# Patient Record
Sex: MALE | Race: BLACK OR AFRICAN AMERICAN | NOT HISPANIC OR LATINO | Employment: FULL TIME | RURAL
[De-identification: names, ages, dates, MRNs, and addresses within clinical notes are randomized per-mention and may not be internally consistent; named-entity substitution may affect disease eponyms.]

---

## 2015-09-04 LAB — CRC RECOMMENDATION EXT: NORMAL

## 2020-11-11 ENCOUNTER — HISTORICAL (OUTPATIENT)
Dept: ADMINISTRATIVE | Facility: HOSPITAL | Age: 50
End: 2020-11-11

## 2020-11-11 LAB — SARS-COV+SARS-COV-2 AG RESP QL IA.RAPID: NEGATIVE

## 2020-12-14 ENCOUNTER — HISTORICAL (OUTPATIENT)
Dept: ADMINISTRATIVE | Facility: HOSPITAL | Age: 50
End: 2020-12-14

## 2020-12-14 LAB
CHOLEST SERPL-MCNC: 198 MG/DL
HDLC SERPL-MCNC: 113 MG/DL
LDLC SERPL CALC-MCNC: 76 MG/DL
NONHDLC SERPL-MCNC: 85 MG/DL
TRIGL SERPL-MCNC: 47 MG/DL
VLDLC SERPL-MCNC: 9 MG/DL

## 2021-01-13 ENCOUNTER — HISTORICAL (OUTPATIENT)
Dept: ADMINISTRATIVE | Facility: HOSPITAL | Age: 51
End: 2021-01-13

## 2021-01-28 ENCOUNTER — HISTORICAL (OUTPATIENT)
Dept: ADMINISTRATIVE | Facility: HOSPITAL | Age: 51
End: 2021-01-28

## 2021-05-07 VITALS
DIASTOLIC BLOOD PRESSURE: 90 MMHG | BODY MASS INDEX: 24.31 KG/M2 | WEIGHT: 179.5 LBS | HEART RATE: 108 BPM | OXYGEN SATURATION: 96 % | RESPIRATION RATE: 20 BRPM | SYSTOLIC BLOOD PRESSURE: 140 MMHG | TEMPERATURE: 98 F | HEIGHT: 72 IN

## 2021-05-07 RX ORDER — PANTOPRAZOLE SODIUM 40 MG/1
40 TABLET, DELAYED RELEASE ORAL DAILY
COMMUNITY
End: 2022-09-13 | Stop reason: SDUPTHER

## 2021-05-07 RX ORDER — ERGOCALCIFEROL 1.25 MG/1
50000 CAPSULE ORAL
COMMUNITY
End: 2022-09-13

## 2021-05-07 RX ORDER — AMLODIPINE BESYLATE 5 MG/1
5 TABLET ORAL DAILY
COMMUNITY
End: 2022-04-13

## 2021-06-01 DIAGNOSIS — D64.9 LOW HEMOGLOBIN: Primary | ICD-10-CM

## 2021-08-19 ENCOUNTER — OFFICE VISIT (OUTPATIENT)
Dept: PRIMARY CARE CLINIC | Facility: CLINIC | Age: 51
End: 2021-08-19

## 2021-08-19 VITALS
OXYGEN SATURATION: 96 % | BODY MASS INDEX: 23.16 KG/M2 | HEART RATE: 114 BPM | WEIGHT: 171 LBS | SYSTOLIC BLOOD PRESSURE: 140 MMHG | TEMPERATURE: 97 F | DIASTOLIC BLOOD PRESSURE: 84 MMHG | HEIGHT: 72 IN

## 2021-08-19 DIAGNOSIS — L02.212 ABSCESS OF UPPER BACK EXCLUDING SCAPULAR REGION: Primary | ICD-10-CM

## 2021-08-19 PROCEDURE — 10060 I&D ABSCESS SIMPLE/SINGLE: CPT | Mod: ,,, | Performed by: FAMILY MEDICINE

## 2021-08-19 PROCEDURE — 99213 PR OFFICE/OUTPT VISIT, EST, LEVL III, 20-29 MIN: ICD-10-PCS | Mod: 25,,, | Performed by: FAMILY MEDICINE

## 2021-08-19 PROCEDURE — 87653 STREP B DNA AMP PROBE: CPT | Mod: ,,, | Performed by: CLINICAL MEDICAL LABORATORY

## 2021-08-19 PROCEDURE — 87653 CULTURE, WOUND: ICD-10-PCS | Mod: ,,, | Performed by: CLINICAL MEDICAL LABORATORY

## 2021-08-19 PROCEDURE — 10060 PR DRAIN SKIN ABSCESS SIMPLE: ICD-10-PCS | Mod: ,,, | Performed by: FAMILY MEDICINE

## 2021-08-19 PROCEDURE — 99213 OFFICE O/P EST LOW 20 MIN: CPT | Mod: 25,,, | Performed by: FAMILY MEDICINE

## 2021-08-19 RX ORDER — IRON,CARB/VIT C/VIT B12/FOLIC 100-250-1
TABLET ORAL
COMMUNITY
Start: 2021-05-14 | End: 2022-04-13 | Stop reason: SDUPTHER

## 2021-08-19 RX ORDER — LIDOCAINE HYDROCHLORIDE 10 MG/ML
1 INJECTION, SOLUTION EPIDURAL; INFILTRATION; INTRACAUDAL; PERINEURAL
Status: DISCONTINUED | OUTPATIENT
Start: 2021-08-19 | End: 2022-04-13

## 2021-08-19 RX ORDER — POTASSIUM CHLORIDE 750 MG/1
TABLET, EXTENDED RELEASE ORAL
COMMUNITY
Start: 2021-05-14 | End: 2022-09-13 | Stop reason: SDUPTHER

## 2021-08-19 RX ORDER — SULFAMETHOXAZOLE AND TRIMETHOPRIM 800; 160 MG/1; MG/1
1 TABLET ORAL 2 TIMES DAILY
Qty: 20 TABLET | Refills: 0 | Status: SHIPPED | OUTPATIENT
Start: 2021-08-19 | End: 2022-04-13

## 2021-08-19 RX ORDER — SILDENAFIL 50 MG/1
TABLET, FILM COATED ORAL
COMMUNITY
Start: 2021-07-16 | End: 2022-04-13

## 2021-08-21 LAB — MICROORGANISM SPEC CULT: NORMAL

## 2021-08-26 ENCOUNTER — OFFICE VISIT (OUTPATIENT)
Dept: PRIMARY CARE CLINIC | Facility: CLINIC | Age: 51
End: 2021-08-26

## 2021-08-26 VITALS
SYSTOLIC BLOOD PRESSURE: 144 MMHG | HEIGHT: 72 IN | HEART RATE: 97 BPM | DIASTOLIC BLOOD PRESSURE: 88 MMHG | OXYGEN SATURATION: 99 % | RESPIRATION RATE: 20 BRPM | BODY MASS INDEX: 22.35 KG/M2 | WEIGHT: 165 LBS

## 2021-08-26 DIAGNOSIS — L02.212 ABSCESS OF BACK: Primary | ICD-10-CM

## 2021-08-26 PROCEDURE — 99212 PR OFFICE/OUTPT VISIT, EST, LEVL II, 10-19 MIN: ICD-10-PCS | Mod: ,,, | Performed by: FAMILY MEDICINE

## 2021-08-26 PROCEDURE — 99212 OFFICE O/P EST SF 10 MIN: CPT | Mod: ,,, | Performed by: FAMILY MEDICINE

## 2021-08-26 RX ORDER — LIDOCAINE 50 MG/G
PATCH TOPICAL
COMMUNITY
Start: 2021-03-18 | End: 2022-04-13

## 2021-08-26 RX ORDER — PREDNISOLONE ACETATE 10 MG/ML
SUSPENSION/ DROPS OPHTHALMIC
COMMUNITY
Start: 2021-06-02 | End: 2022-04-13

## 2022-04-13 ENCOUNTER — OFFICE VISIT (OUTPATIENT)
Dept: FAMILY MEDICINE | Facility: CLINIC | Age: 52
End: 2022-04-13
Payer: COMMERCIAL

## 2022-04-13 VITALS
DIASTOLIC BLOOD PRESSURE: 69 MMHG | HEART RATE: 84 BPM | TEMPERATURE: 98 F | HEIGHT: 72 IN | OXYGEN SATURATION: 98 % | BODY MASS INDEX: 21.92 KG/M2 | SYSTOLIC BLOOD PRESSURE: 113 MMHG | RESPIRATION RATE: 18 BRPM | WEIGHT: 161.81 LBS

## 2022-04-13 DIAGNOSIS — D64.9 ANEMIA, UNSPECIFIED TYPE: ICD-10-CM

## 2022-04-13 DIAGNOSIS — H54.40 BLINDNESS OF RIGHT EYE WITH NORMAL VISION IN CONTRALATERAL EYE: ICD-10-CM

## 2022-04-13 DIAGNOSIS — Z11.4 SCREENING FOR HIV (HUMAN IMMUNODEFICIENCY VIRUS): ICD-10-CM
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
## 2022-04-13 DIAGNOSIS — F10.29 ALCOHOL DEPENDENCE WITH UNSPECIFIED ALCOHOL-INDUCED DISORDER: ICD-10-CM

## 2022-04-13 DIAGNOSIS — I51.7 LVH (LEFT VENTRICULAR HYPERTROPHY): ICD-10-CM

## 2022-04-13 DIAGNOSIS — R42 DIZZY SPELLS: Primary | ICD-10-CM

## 2022-04-13 DIAGNOSIS — R63.4 WEIGHT LOSS: ICD-10-CM

## 2022-04-13 DIAGNOSIS — Z12.5 SCREENING PSA (PROSTATE SPECIFIC ANTIGEN): ICD-10-CM

## 2022-04-13 DIAGNOSIS — T74.11XA ABUSIVE PHYSICAL RELATIONSHIP WITH PARTNER OR SPOUSE, INITIAL ENCOUNTER: ICD-10-CM

## 2022-04-13 DIAGNOSIS — R55 SYNCOPE AND COLLAPSE: ICD-10-CM

## 2022-04-13 DIAGNOSIS — R30.0 DYSURIA: ICD-10-CM

## 2022-04-13 DIAGNOSIS — Z11.59 ENCOUNTER FOR HEPATITIS C SCREENING TEST FOR LOW RISK PATIENT: ICD-10-CM

## 2022-04-13 DIAGNOSIS — Z11.3 SCREENING EXAMINATION FOR STD (SEXUALLY TRANSMITTED DISEASE): ICD-10-CM

## 2022-04-13 LAB
ALBUMIN SERPL BCP-MCNC: 3.6 G/DL (ref 3.5–5)
ALBUMIN/GLOB SERPL: 0.8 {RATIO}
ALP SERPL-CCNC: 73 U/L (ref 45–115)
ALT SERPL W P-5'-P-CCNC: 29 U/L (ref 16–61)
ANION GAP SERPL CALCULATED.3IONS-SCNC: 12 MMOL/L (ref 7–16)
AST SERPL W P-5'-P-CCNC: 55 U/L (ref 15–37)
BASOPHILS # BLD AUTO: 0.07 K/UL (ref 0–0.2)
BASOPHILS NFR BLD AUTO: 1.9 % (ref 0–1)
BILIRUB SERPL-MCNC: 0.2 MG/DL (ref 0–1.2)
BILIRUB SERPL-MCNC: NEGATIVE MG/DL
BLOOD URINE, POC: NEGATIVE
BUN SERPL-MCNC: 13 MG/DL (ref 7–18)
BUN/CREAT SERPL: 10 (ref 6–20)
CALCIUM SERPL-MCNC: 9.1 MG/DL (ref 8.5–10.1)
CHLORIDE SERPL-SCNC: 114 MMOL/L (ref 98–107)
CHOLEST SERPL-MCNC: 195 MG/DL (ref 0–200)
CHOLEST/HDLC SERPL: 1.8 {RATIO}
CO2 SERPL-SCNC: 23 MMOL/L (ref 21–32)
COLOR, POC UA: NORMAL
CREAT SERPL-MCNC: 1.25 MG/DL (ref 0.7–1.3)
DIFFERENTIAL METHOD BLD: ABNORMAL
EKG 12-LEAD: 10
EOSINOPHIL # BLD AUTO: 0.12 K/UL (ref 0–0.5)
EOSINOPHIL NFR BLD AUTO: 3.3 % (ref 1–4)
ERYTHROCYTE [DISTWIDTH] IN BLOOD BY AUTOMATED COUNT: 19.1 % (ref 11.5–14.5)
GLOBULIN SER-MCNC: 4.3 G/DL (ref 2–4)
GLUCOSE SERPL-MCNC: 83 MG/DL (ref 70–110)
GLUCOSE SERPL-MCNC: 92 MG/DL (ref 74–106)
GLUCOSE UR QL STRIP: NEGATIVE
HCT VFR BLD AUTO: 33 % (ref 40–54)
HCV AB SER QL: NORMAL
HDLC SERPL-MCNC: 111 MG/DL (ref 40–60)
HGB BLD-MCNC: 9.9 G/DL (ref 13.5–18)
HGB, POC: 10 G/DL (ref 14–18)
HIV 1+O+2 AB SERPL QL: NORMAL
IMM GRANULOCYTES # BLD AUTO: 0 K/UL (ref 0–0.04)
IMM GRANULOCYTES NFR BLD: 0 % (ref 0–0.4)
IRON SATN MFR SERPL: 3 % (ref 14–50)
IRON SERPL-MCNC: 14 ΜG/DL (ref 65–175)
KETONES UR QL STRIP: NEGATIVE
LDLC SERPL CALC-MCNC: 72 MG/DL
LDLC/HDLC SERPL: 0.6 {RATIO}
LEUKOCYTE ESTERASE URINE, POC: NEGATIVE
LYMPHOCYTES # BLD AUTO: 1.6 K/UL (ref 1–4.8)
LYMPHOCYTES NFR BLD AUTO: 43.6 % (ref 27–41)
MCH RBC QN AUTO: 25.8 PG (ref 27–31)
MCHC RBC AUTO-ENTMCNC: 30 G/DL (ref 32–36)
MCV RBC AUTO: 86.2 FL (ref 80–96)
MONOCYTES # BLD AUTO: 0.51 K/UL (ref 0–0.8)
MONOCYTES NFR BLD AUTO: 13.9 % (ref 2–6)
MPC BLD CALC-MCNC: 11.4 FL (ref 9.4–12.4)
NEUTROPHILS # BLD AUTO: 1.37 K/UL (ref 1.8–7.7)
NEUTROPHILS NFR BLD AUTO: 37.3 % (ref 53–65)
NITRITE, POC UA: NEGATIVE
NONHDLC SERPL-MCNC: 84 MG/DL
NRBC # BLD AUTO: 0 X10E3/UL
NRBC, AUTO (.00): 0 %
PH, POC UA: 5
PLATELET # BLD AUTO: 187 K/UL (ref 150–400)
POTASSIUM SERPL-SCNC: 3.8 MMOL/L (ref 3.5–5.1)
PR INTERVAL: NORMAL
PROT SERPL-MCNC: 7.9 G/DL (ref 6.4–8.2)
PROTEIN, POC: NEGATIVE
PRT AXES: NORMAL
QRS DURATION: 86
QT/QTC: NORMAL
RBC # BLD AUTO: 3.83 M/UL (ref 4.6–6.2)
SODIUM SERPL-SCNC: 145 MMOL/L (ref 136–145)
SPECIFIC GRAVITY, POC UA: 1.01
SYPHILIS AB INTERPRETATION: NORMAL
TIBC SERPL-MCNC: 438 ΜG/DL (ref 250–450)
TRIGL SERPL-MCNC: 60 MG/DL (ref 35–150)
TSH SERPL DL<=0.005 MIU/L-ACNC: 0.35 UIU/ML (ref 0.36–3.74)
UROBILINOGEN, POC UA: 0.2
VENTRICULAR RATE: 83
VIT B12 SERPL-MCNC: 293 PG/ML (ref 193–986)
VLDLC SERPL-MCNC: 12 MG/DL
WBC # BLD AUTO: 3.67 K/UL (ref 4.5–11)

## 2022-04-13 PROCEDURE — 3008F BODY MASS INDEX DOCD: CPT | Mod: ,,, | Performed by: FAMILY MEDICINE

## 2022-04-13 PROCEDURE — 93000 ELECTROCARDIOGRAM COMPLETE: CPT | Mod: ,,, | Performed by: FAMILY MEDICINE

## 2022-04-13 PROCEDURE — 99214 OFFICE O/P EST MOD 30 MIN: CPT | Mod: ,,, | Performed by: FAMILY MEDICINE

## 2022-04-13 PROCEDURE — 99214 PR OFFICE/OUTPT VISIT, EST, LEVL IV, 30-39 MIN: ICD-10-PCS | Mod: ,,, | Performed by: FAMILY MEDICINE

## 2022-04-13 PROCEDURE — 80050 GENERAL HEALTH PANEL: CPT | Mod: ,,, | Performed by: CLINICAL MEDICAL LABORATORY

## 2022-04-13 PROCEDURE — 87389 HIV 1 / 2 ANTIBODY: ICD-10-PCS | Mod: ,,, | Performed by: CLINICAL MEDICAL LABORATORY

## 2022-04-13 PROCEDURE — 85018 POCT HEMOGLOBIN: ICD-10-PCS | Mod: ,,, | Performed by: FAMILY MEDICINE

## 2022-04-13 PROCEDURE — 83540 ASSAY OF IRON: CPT | Mod: ,,, | Performed by: CLINICAL MEDICAL LABORATORY

## 2022-04-13 PROCEDURE — 80061 LIPID PANEL: ICD-10-PCS | Mod: ,,, | Performed by: CLINICAL MEDICAL LABORATORY

## 2022-04-13 PROCEDURE — 3078F PR MOST RECENT DIASTOLIC BLOOD PRESSURE < 80 MM HG: ICD-10-PCS | Mod: ,,, | Performed by: FAMILY MEDICINE

## 2022-04-13 PROCEDURE — 87591 CHLAMYDIA/GONORRHOEAE(GC), PCR: ICD-10-PCS | Mod: ,,, | Performed by: CLINICAL MEDICAL LABORATORY

## 2022-04-13 PROCEDURE — 1160F RVW MEDS BY RX/DR IN RCRD: CPT | Mod: ,,, | Performed by: FAMILY MEDICINE

## 2022-04-13 PROCEDURE — 80061 LIPID PANEL: CPT | Mod: ,,, | Performed by: CLINICAL MEDICAL LABORATORY

## 2022-04-13 PROCEDURE — 3078F DIAST BP <80 MM HG: CPT | Mod: ,,, | Performed by: FAMILY MEDICINE

## 2022-04-13 PROCEDURE — 84154 PSA, TOTAL AND FREE: ICD-10-PCS | Mod: 90,,, | Performed by: CLINICAL MEDICAL LABORATORY

## 2022-04-13 PROCEDURE — 3008F PR BODY MASS INDEX (BMI) DOCUMENTED: ICD-10-PCS | Mod: ,,, | Performed by: FAMILY MEDICINE

## 2022-04-13 PROCEDURE — 86780 TREPONEMA PALLIDUM: CPT | Mod: ,,, | Performed by: CLINICAL MEDICAL LABORATORY

## 2022-04-13 PROCEDURE — 3074F PR MOST RECENT SYSTOLIC BLOOD PRESSURE < 130 MM HG: ICD-10-PCS | Mod: ,,, | Performed by: FAMILY MEDICINE

## 2022-04-13 PROCEDURE — 87491 CHLAMYDIA/GONORRHOEAE(GC), PCR: ICD-10-PCS | Mod: ,,, | Performed by: CLINICAL MEDICAL LABORATORY

## 2022-04-13 PROCEDURE — 83540 IRON AND TIBC: ICD-10-PCS | Mod: ,,, | Performed by: CLINICAL MEDICAL LABORATORY

## 2022-04-13 PROCEDURE — 86803 HEPATITIS C AB TEST: CPT | Mod: ,,, | Performed by: CLINICAL MEDICAL LABORATORY

## 2022-04-13 PROCEDURE — 1159F PR MEDICATION LIST DOCUMENTED IN MEDICAL RECORD: ICD-10-PCS | Mod: ,,, | Performed by: FAMILY MEDICINE

## 2022-04-13 PROCEDURE — 86780 TREPONEMA PALLIDUM (SYPHILIS) ANTIBODY: ICD-10-PCS | Mod: ,,, | Performed by: CLINICAL MEDICAL LABORATORY

## 2022-04-13 PROCEDURE — 80050 COMPREHENSIVE METABOLIC PANEL: ICD-10-PCS | Mod: ,,, | Performed by: CLINICAL MEDICAL LABORATORY

## 2022-04-13 PROCEDURE — 83550 IRON BINDING TEST: CPT | Mod: ,,, | Performed by: CLINICAL MEDICAL LABORATORY

## 2022-04-13 PROCEDURE — 85018 HEMOGLOBIN: CPT | Mod: ,,, | Performed by: FAMILY MEDICINE

## 2022-04-13 PROCEDURE — 93000 POCT EKG 12-LEAD: ICD-10-PCS | Mod: ,,, | Performed by: FAMILY MEDICINE

## 2022-04-13 PROCEDURE — 84154 ASSAY OF PSA FREE: CPT | Mod: 90,,, | Performed by: CLINICAL MEDICAL LABORATORY

## 2022-04-13 PROCEDURE — 87389 HIV-1 AG W/HIV-1&-2 AB AG IA: CPT | Mod: ,,, | Performed by: CLINICAL MEDICAL LABORATORY

## 2022-04-13 PROCEDURE — 86803 HEPATITIS C ANTIBODY: ICD-10-PCS | Mod: ,,, | Performed by: CLINICAL MEDICAL LABORATORY

## 2022-04-13 PROCEDURE — 3074F SYST BP LT 130 MM HG: CPT | Mod: ,,, | Performed by: FAMILY MEDICINE

## 2022-04-13 PROCEDURE — 82607 VITAMIN B-12: CPT | Mod: ,,, | Performed by: CLINICAL MEDICAL LABORATORY

## 2022-04-13 PROCEDURE — 81003 POCT URINALYSIS W/O SCOPE: ICD-10-PCS | Mod: QW,,, | Performed by: FAMILY MEDICINE

## 2022-04-13 PROCEDURE — 87591 N.GONORRHOEAE DNA AMP PROB: CPT | Mod: ,,, | Performed by: CLINICAL MEDICAL LABORATORY

## 2022-04-13 PROCEDURE — 1159F MED LIST DOCD IN RCRD: CPT | Mod: ,,, | Performed by: FAMILY MEDICINE

## 2022-04-13 PROCEDURE — 82607 VITAMIN B12: ICD-10-PCS | Mod: ,,, | Performed by: CLINICAL MEDICAL LABORATORY

## 2022-04-13 PROCEDURE — 1160F PR REVIEW ALL MEDS BY PRESCRIBER/CLIN PHARMACIST DOCUMENTED: ICD-10-PCS | Mod: ,,, | Performed by: FAMILY MEDICINE

## 2022-04-13 PROCEDURE — 83550 IRON AND TIBC: ICD-10-PCS | Mod: ,,, | Performed by: CLINICAL MEDICAL LABORATORY

## 2022-04-13 PROCEDURE — 84153 ASSAY OF PSA TOTAL: CPT | Mod: 90,,, | Performed by: CLINICAL MEDICAL LABORATORY

## 2022-04-13 PROCEDURE — 84153 PSA, TOTAL AND FREE: ICD-10-PCS | Mod: 90,,, | Performed by: CLINICAL MEDICAL LABORATORY

## 2022-04-13 PROCEDURE — 87491 CHLMYD TRACH DNA AMP PROBE: CPT | Mod: ,,, | Performed by: CLINICAL MEDICAL LABORATORY

## 2022-04-13 PROCEDURE — 81003 URINALYSIS AUTO W/O SCOPE: CPT | Mod: QW,,, | Performed by: FAMILY MEDICINE

## 2022-04-13 RX ORDER — NAPROXEN 500 MG/1
500 TABLET ORAL 2 TIMES DAILY PRN
COMMUNITY
End: 2022-09-13

## 2022-04-13 RX ORDER — IRON,CARB/VIT C/VIT B12/FOLIC 100-250-1
TABLET ORAL
Qty: 90 TABLET | Refills: 3 | Status: SHIPPED | OUTPATIENT
Start: 2022-04-13 | End: 2022-09-13 | Stop reason: SDUPTHER

## 2022-04-13 RX ORDER — METOPROLOL SUCCINATE 25 MG/1
25 TABLET, EXTENDED RELEASE ORAL DAILY
Qty: 90 TABLET | Refills: 3 | Status: SHIPPED | OUTPATIENT
Start: 2022-04-13 | End: 2022-09-13
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
## 2022-04-14 LAB
CHLAMYDIA BY PCR: NEGATIVE
N. GONORRHOEAE (GC) BY PCR: NEGATIVE

## 2022-04-15 LAB
PSA FREE MFR SERPL: NORMAL RATIO
PSA FREE SERPL IA-MCNC: 0.3 NG/ML
PSA SERPL IA-MCNC: 1.4 NG/ML

## 2022-05-09 ENCOUNTER — TELEPHONE (OUTPATIENT)
Dept: FAMILY MEDICINE | Facility: CLINIC | Age: 52
End: 2022-05-09
Payer: COMMERCIAL

## 2022-05-09 DIAGNOSIS — R42 DIZZY SPELLS: Primary | ICD-10-CM

## 2022-09-13 ENCOUNTER — OFFICE VISIT (OUTPATIENT)
Dept: FAMILY MEDICINE | Facility: CLINIC | Age: 52
End: 2022-09-13
Payer: COMMERCIAL

## 2022-09-13 VITALS
HEART RATE: 82 BPM | DIASTOLIC BLOOD PRESSURE: 80 MMHG | SYSTOLIC BLOOD PRESSURE: 130 MMHG | BODY MASS INDEX: 21.67 KG/M2 | HEIGHT: 72 IN | WEIGHT: 160 LBS | TEMPERATURE: 98 F | OXYGEN SATURATION: 95 %

## 2022-09-13 DIAGNOSIS — I10 HYPERTENSION, UNSPECIFIED TYPE: Primary | ICD-10-CM

## 2022-09-13 DIAGNOSIS — K21.9 GASTROESOPHAGEAL REFLUX DISEASE WITHOUT ESOPHAGITIS: ICD-10-CM

## 2022-09-13 DIAGNOSIS — E55.9 VITAMIN D DEFICIENCY: ICD-10-CM

## 2022-09-13 DIAGNOSIS — D64.9 ANEMIA, UNSPECIFIED TYPE: ICD-10-CM

## 2022-09-13 DIAGNOSIS — E78.2 MIXED HYPERLIPIDEMIA: ICD-10-CM

## 2022-09-13 DIAGNOSIS — D50.8 OTHER IRON DEFICIENCY ANEMIA: ICD-10-CM

## 2022-09-13 DIAGNOSIS — E87.6 HYPOKALEMIA: ICD-10-CM

## 2022-09-13 LAB
25(OH)D3 SERPL-MCNC: 14.1 NG/ML
ALBUMIN SERPL BCP-MCNC: 4.3 G/DL (ref 3.5–5)
ALBUMIN/GLOB SERPL: 1 {RATIO}
ALP SERPL-CCNC: 94 U/L (ref 45–115)
ALT SERPL W P-5'-P-CCNC: 60 U/L (ref 16–61)
ANION GAP SERPL CALCULATED.3IONS-SCNC: 14 MMOL/L (ref 7–16)
AST SERPL W P-5'-P-CCNC: 174 U/L (ref 15–37)
BASOPHILS # BLD AUTO: 0.04 K/UL (ref 0–0.2)
BASOPHILS NFR BLD AUTO: 1.1 % (ref 0–1)
BILIRUB SERPL-MCNC: 0.8 MG/DL (ref ?–1.2)
BUN SERPL-MCNC: 16 MG/DL (ref 7–18)
BUN/CREAT SERPL: 8 (ref 6–20)
CALCIUM SERPL-MCNC: 9.5 MG/DL (ref 8.5–10.1)
CHLORIDE SERPL-SCNC: 93 MMOL/L (ref 98–107)
CHOLEST SERPL-MCNC: 256 MG/DL (ref 0–200)
CHOLEST/HDLC SERPL: 1.8 {RATIO}
CO2 SERPL-SCNC: 31 MMOL/L (ref 21–32)
CREAT SERPL-MCNC: 1.98 MG/DL (ref 0.7–1.3)
DIFFERENTIAL METHOD BLD: ABNORMAL
EGFR (NO RACE VARIABLE) (RUSH/TITUS): 40 ML/MIN/1.73M²
EOSINOPHIL # BLD AUTO: 0.19 K/UL (ref 0–0.5)
EOSINOPHIL NFR BLD AUTO: 5.3 % (ref 1–4)
ERYTHROCYTE [DISTWIDTH] IN BLOOD BY AUTOMATED COUNT: 17.3 % (ref 11.5–14.5)
GLOBULIN SER-MCNC: 4.5 G/DL (ref 2–4)
GLUCOSE SERPL-MCNC: 81 MG/DL (ref 74–106)
HCT VFR BLD AUTO: 36.2 % (ref 40–54)
HDLC SERPL-MCNC: 139 MG/DL (ref 40–60)
HGB BLD-MCNC: 11.9 G/DL (ref 13.5–18)
IMM GRANULOCYTES # BLD AUTO: 0.01 K/UL (ref 0–0.04)
IMM GRANULOCYTES NFR BLD: 0.3 % (ref 0–0.4)
IRON SATN MFR SERPL: 10 % (ref 14–50)
IRON SERPL-MCNC: 45 ΜG/DL (ref 65–175)
LDLC SERPL CALC-MCNC: 104 MG/DL
LDLC/HDLC SERPL: 0.7 {RATIO}
LYMPHOCYTES # BLD AUTO: 1.26 K/UL (ref 1–4.8)
LYMPHOCYTES NFR BLD AUTO: 34.9 % (ref 27–41)
MCH RBC QN AUTO: 28.8 PG (ref 27–31)
MCHC RBC AUTO-ENTMCNC: 32.9 G/DL (ref 32–36)
MCV RBC AUTO: 87.7 FL (ref 80–96)
MONOCYTES # BLD AUTO: 0.5 K/UL (ref 0–0.8)
MONOCYTES NFR BLD AUTO: 13.9 % (ref 2–6)
MPC BLD CALC-MCNC: 11.7 FL (ref 9.4–12.4)
NEUTROPHILS # BLD AUTO: 1.61 K/UL (ref 1.8–7.7)
NEUTROPHILS NFR BLD AUTO: 44.5 % (ref 53–65)
NONHDLC SERPL-MCNC: 117 MG/DL
NRBC # BLD AUTO: 0 X10E3/UL
NRBC, AUTO (.00): 0 %
PLATELET # BLD AUTO: 116 K/UL (ref 150–400)
POTASSIUM SERPL-SCNC: 3.6 MMOL/L (ref 3.5–5.1)
PROT SERPL-MCNC: 8.8 G/DL (ref 6.4–8.2)
RBC # BLD AUTO: 4.13 M/UL (ref 4.6–6.2)
SODIUM SERPL-SCNC: 134 MMOL/L (ref 136–145)
TIBC SERPL-MCNC: 459 ΜG/DL (ref 250–450)
TRIGL SERPL-MCNC: 64 MG/DL (ref 35–150)
VLDLC SERPL-MCNC: 13 MG/DL
WBC # BLD AUTO: 3.61 K/UL (ref 4.5–11)

## 2022-09-13 PROCEDURE — 1160F RVW MEDS BY RX/DR IN RCRD: CPT | Mod: ,,, | Performed by: NURSE PRACTITIONER

## 2022-09-13 PROCEDURE — 1160F PR REVIEW ALL MEDS BY PRESCRIBER/CLIN PHARMACIST DOCUMENTED: ICD-10-PCS | Mod: ,,, | Performed by: NURSE PRACTITIONER

## 2022-09-13 PROCEDURE — 3079F DIAST BP 80-89 MM HG: CPT | Mod: ,,, | Performed by: NURSE PRACTITIONER

## 2022-09-13 PROCEDURE — 3079F PR MOST RECENT DIASTOLIC BLOOD PRESSURE 80-89 MM HG: ICD-10-PCS | Mod: ,,, | Performed by: NURSE PRACTITIONER

## 2022-09-13 PROCEDURE — 80053 COMPREHENSIVE METABOLIC PANEL: ICD-10-PCS | Mod: ,,, | Performed by: CLINICAL MEDICAL LABORATORY

## 2022-09-13 PROCEDURE — 83550 IRON BINDING TEST: CPT | Mod: ,,, | Performed by: CLINICAL MEDICAL LABORATORY

## 2022-09-13 PROCEDURE — 80053 COMPREHEN METABOLIC PANEL: CPT | Mod: ,,, | Performed by: CLINICAL MEDICAL LABORATORY

## 2022-09-13 PROCEDURE — 83550 IRON AND TIBC: ICD-10-PCS | Mod: ,,, | Performed by: CLINICAL MEDICAL LABORATORY

## 2022-09-13 PROCEDURE — 1159F PR MEDICATION LIST DOCUMENTED IN MEDICAL RECORD: ICD-10-PCS | Mod: ,,, | Performed by: NURSE PRACTITIONER

## 2022-09-13 PROCEDURE — 3075F PR MOST RECENT SYSTOLIC BLOOD PRESS GE 130-139MM HG: ICD-10-PCS | Mod: ,,, | Performed by: NURSE PRACTITIONER

## 2022-09-13 PROCEDURE — 85025 CBC WITH DIFFERENTIAL: ICD-10-PCS | Mod: ,,, | Performed by: CLINICAL MEDICAL LABORATORY

## 2022-09-13 PROCEDURE — 3075F SYST BP GE 130 - 139MM HG: CPT | Mod: ,,, | Performed by: NURSE PRACTITIONER

## 2022-09-13 PROCEDURE — 80061 LIPID PANEL: ICD-10-PCS | Mod: ,,, | Performed by: CLINICAL MEDICAL LABORATORY

## 2022-09-13 PROCEDURE — 99213 OFFICE O/P EST LOW 20 MIN: CPT | Mod: ,,, | Performed by: NURSE PRACTITIONER

## 2022-09-13 PROCEDURE — 83540 ASSAY OF IRON: CPT | Mod: ,,, | Performed by: CLINICAL MEDICAL LABORATORY

## 2022-09-13 PROCEDURE — 82306 VITAMIN D 25 HYDROXY: CPT | Mod: ,,, | Performed by: CLINICAL MEDICAL LABORATORY

## 2022-09-13 PROCEDURE — 3008F BODY MASS INDEX DOCD: CPT | Mod: ,,, | Performed by: NURSE PRACTITIONER

## 2022-09-13 PROCEDURE — 80061 LIPID PANEL: CPT | Mod: ,,, | Performed by: CLINICAL MEDICAL LABORATORY

## 2022-09-13 PROCEDURE — 83540 IRON AND TIBC: ICD-10-PCS | Mod: ,,, | Performed by: CLINICAL MEDICAL LABORATORY

## 2022-09-13 PROCEDURE — 3008F PR BODY MASS INDEX (BMI) DOCUMENTED: ICD-10-PCS | Mod: ,,, | Performed by: NURSE PRACTITIONER

## 2022-09-13 PROCEDURE — 1159F MED LIST DOCD IN RCRD: CPT | Mod: ,,, | Performed by: NURSE PRACTITIONER

## 2022-09-13 PROCEDURE — 99213 PR OFFICE/OUTPT VISIT, EST, LEVL III, 20-29 MIN: ICD-10-PCS | Mod: ,,, | Performed by: NURSE PRACTITIONER

## 2022-09-13 PROCEDURE — 85025 COMPLETE CBC W/AUTO DIFF WBC: CPT | Mod: ,,, | Performed by: CLINICAL MEDICAL LABORATORY

## 2022-09-13 PROCEDURE — 82306 VITAMIN D: ICD-10-PCS | Mod: ,,, | Performed by: CLINICAL MEDICAL LABORATORY

## 2022-09-13 RX ORDER — CHLORTHALIDONE 25 MG/1
25 TABLET ORAL DAILY
COMMUNITY
Start: 2022-09-10 | End: 2023-10-16 | Stop reason: SDUPTHER

## 2022-09-13 RX ORDER — METOPROLOL SUCCINATE 50 MG/1
50 TABLET, EXTENDED RELEASE ORAL DAILY
COMMUNITY
Start: 2022-09-10 | End: 2022-12-19

## 2022-09-13 RX ORDER — PANTOPRAZOLE SODIUM 40 MG/1
40 TABLET, DELAYED RELEASE ORAL 2 TIMES DAILY
Qty: 60 TABLET | Refills: 5 | Status: SHIPPED | OUTPATIENT
Start: 2022-09-13 | End: 2022-12-19 | Stop reason: SDUPTHER

## 2022-09-13 RX ORDER — LATANOPROST 50 UG/ML
1 SOLUTION/ DROPS OPHTHALMIC NIGHTLY
Qty: 2.5 ML | Refills: 5 | Status: SHIPPED | OUTPATIENT
Start: 2022-09-13 | End: 2022-12-19

## 2022-09-13 RX ORDER — LATANOPROST 50 UG/ML
1 SOLUTION/ DROPS OPHTHALMIC NIGHTLY
COMMUNITY
Start: 2022-08-02 | End: 2022-09-13 | Stop reason: SDUPTHER

## 2022-09-13 RX ORDER — IRON,CARB/VIT C/VIT B12/FOLIC 100-250-1
TABLET ORAL
Qty: 30 TABLET | Refills: 5 | Status: SHIPPED | OUTPATIENT
Start: 2022-09-13 | End: 2022-09-14

## 2022-09-13 RX ORDER — POTASSIUM CHLORIDE 750 MG/1
10 TABLET, EXTENDED RELEASE ORAL 2 TIMES DAILY
Qty: 60 TABLET | Refills: 5 | Status: SHIPPED | OUTPATIENT
Start: 2022-09-13 | End: 2022-12-19

## 2022-09-13 NOTE — LETTER
September 13, 2022      [facility] [address]  [place] [address]  Phone: [phone]  Fax: [phone]       Patient: Jesse Lira   YOB: 1970  Date of Visit: 09/13/2022    To Whom It May Concern:    Ronnell Lira  was at [facility] on 09/13/2022. The patient may return to work/school on 09/14/2022 with no restrictions. If you have any questions or concerns, or if I can be of further assistance, please do not hesitate to contact me.    Sincerely,    Jud GRANDE/ STEPHANIE Hoffmann RN

## 2022-09-14 RX ORDER — FERROUS SULFATE 325(65) MG
325 TABLET, DELAYED RELEASE (ENTERIC COATED) ORAL 2 TIMES DAILY
Qty: 60 TABLET | Refills: 5 | Status: SHIPPED | OUTPATIENT
Start: 2022-09-14

## 2022-09-14 RX ORDER — ATORVASTATIN CALCIUM 10 MG/1
10 TABLET, FILM COATED ORAL DAILY
Qty: 30 TABLET | Refills: 5 | Status: SHIPPED | OUTPATIENT
Start: 2022-09-14 | End: 2022-12-19 | Stop reason: SDUPTHER

## 2022-09-14 RX ORDER — ERGOCALCIFEROL 1.25 MG/1
50000 CAPSULE ORAL
Qty: 4 CAPSULE | Refills: 5 | Status: SHIPPED | OUTPATIENT
Start: 2022-09-14

## 2022-09-19 NOTE — PROGRESS NOTES
HARJINDER Hewitt   [facility]  [id number] [address] [address]  [phone]      PATIENT NAME: Jesse Lira  : 1970  DATE: 22  MRN: [id number]      Billing Provider: HARJINDER Hewitt  Level of Service:   Patient PCP Information       Provider PCP Type    HARJINDER Hewitt General            Reason for Visit / Chief Complaint: Follow-up (Er leg swollen )       Update PCP  Update Chief Complaint         History of Present Illness / Problem Focused Workflow       52 year old male presents for follow up from ER with left left swelling  Reports he was seen about 2-3 days ago related to swelling in left leg  Denies any shortness of breath, chest pain, cough  States he has had problems with swelling in the past  Blood pressure was elevated while in ER     Hx of anemia, hyperlipidemia, hypertension  Blood pressure is normal today      Review of Systems     Review of Systems   Constitutional:  Negative for fatigue and fever.   HENT:  Negative for congestion.    Eyes:  Negative for visual disturbance.   Respiratory:  Negative for cough and shortness of breath.    Cardiovascular:  Positive for leg swelling. Negative for chest pain and palpitations.   Gastrointestinal:  Negative for abdominal pain, diarrhea and nausea.   Endocrine: Negative for cold intolerance and heat intolerance.   Genitourinary:  Negative for difficulty urinating.   Musculoskeletal:  Negative for gait problem.   Neurological:  Negative for dizziness, weakness and headaches.   Psychiatric/Behavioral:  Negative for agitation and dysphoric mood.      Medical / Social / Family History     Past Medical History:   Diagnosis Date    Anemia     Hyperlipidemia     Hypertension        History reviewed. No pertinent surgical history.    Social History    reports that he has never smoked. He has never used smokeless tobacco. He reports current alcohol use. He reports that he does not  use drugs.    Family History  MrLayla's family history includes No Known Problems in his father and mother.    Medications and Allergies     Medications  Outpatient Medications Marked as Taking for the 9/13/22 encounter (Office Visit) with HARJINDER Hewtit   Medication Sig Dispense Refill    chlorthalidone (HYGROTEN) 25 MG Tab Take 25 mg by mouth once daily.      metoprolol succinate (TOPROL-XL) 50 MG 24 hr tablet Take 50 mg by mouth once daily.      [DISCONTINUED] ergocalciferol (ERGOCALCIFEROL) 50,000 unit Cap Take 50,000 Units by mouth every 7 days.      [DISCONTINUED] IRON 100 PLUS Tab TAKE 1 TABLET BY MOUTH EVERYDAY WITH FOOD. FOR SOURCE OF IRON 90 tablet 3    [DISCONTINUED] latanoprost 0.005 % ophthalmic solution Place 1 drop into the right eye every evening.      [DISCONTINUED] naproxen (NAPROSYN) 500 MG tablet Take 500 mg by mouth 2 (two) times daily as needed.      [DISCONTINUED] pantoprazole (PROTONIX) 40 MG tablet Take 40 mg by mouth once daily.      [DISCONTINUED] potassium chloride (KLOR-CON) 10 MEQ TbSR TAKE 1 TABLET BY MOUTH 2 TIMES A DAY WITH FOOD. FOR POTASSIUM SUPPLEMENT.         Allergies  Review of patient's allergies indicates:   Allergen Reactions    Tomato Other (See Comments)    Tomato (solanum lycopersicum)        Physical Examination     Vitals:    09/13/22 1403   BP: 130/80   Pulse: 82   Temp: 97.8 °F (36.6 °C)     Physical Exam  Constitutional:       General: He is not in acute distress.  HENT:      Head: Normocephalic.      Nose: Nose normal. No congestion.      Mouth/Throat:      Mouth: Mucous membranes are moist.   Eyes:      Extraocular Movements: Extraocular movements intact.   Cardiovascular:      Rate and Rhythm: Normal rate.   Pulmonary:      Effort: Pulmonary effort is normal. No respiratory distress.   Abdominal:      General: Bowel sounds are normal.      Palpations: Abdomen is soft.   Musculoskeletal:         General: Swelling (+1 left lower ext) present. Normal range of motion.       Cervical back: Neck supple.   Skin:     General: Skin is warm.   Neurological:      Mental Status: He is alert and oriented to person, place, and time.   Psychiatric:         Behavior: Behavior normal.         Imaging / Labs     Office Visit on 09/13/2022   Component Date Value Ref Range Status    Triglycerides 09/13/2022 64  35 - 150 mg/dL Final    Cholesterol 09/13/2022 256 (H)  0 - 200 mg/dL Final    HDL Cholesterol 09/13/2022 139 (H)  40 - 60 mg/dL Final    Cholesterol/HDL Ratio (Risk Factor) 09/13/2022 1.8   Final    Non-HDL 09/13/2022 117  mg/dL Final    LDL Calculated 09/13/2022 104  mg/dL Final    LDL/HDL 09/13/2022 0.7   Final    VLDL 09/13/2022 13  mg/dL Final    Sodium 09/13/2022 134 (L)  136 - 145 mmol/L Final    Potassium 09/13/2022 3.6  3.5 - 5.1 mmol/L Final    Chloride 09/13/2022 93 (L)  98 - 107 mmol/L Final    CO2 09/13/2022 31  21 - 32 mmol/L Final    Anion Gap 09/13/2022 14  7 - 16 mmol/L Final    Glucose 09/13/2022 81  74 - 106 mg/dL Final    BUN 09/13/2022 16  7 - 18 mg/dL Final    Creatinine 09/13/2022 1.98 (H)  0.70 - 1.30 mg/dL Final    BUN/Creatinine Ratio 09/13/2022 8  6 - 20 Final    Calcium 09/13/2022 9.5  8.5 - 10.1 mg/dL Final    Total Protein 09/13/2022 8.8 (H)  6.4 - 8.2 g/dL Final    Albumin 09/13/2022 4.3  3.5 - 5.0 g/dL Final    Globulin 09/13/2022 4.5 (H)  2.0 - 4.0 g/dL Final    A/G Ratio 09/13/2022 1.0   Final    Bilirubin, Total 09/13/2022 0.8  >0.0 - 1.2 mg/dL Final    Alk Phos 09/13/2022 94  45 - 115 U/L Final    ALT 09/13/2022 60  16 - 61 U/L Final    AST 09/13/2022 174 (H)  15 - 37 U/L Final    eGFR 09/13/2022 40 (L)  >=60 mL/min/1.73m² Final    Vitamin D 25-Hydroxy, Blood 09/13/2022 14.1  ng/mL Final    Iron 09/13/2022 45 (L)  65 - 175 µg/dL Final    Iron Saturation 09/13/2022 10 (L)  14 - 50 % Final    TIBC 09/13/2022 459 (H)  250 - 450 µg/dL Final    WBC 09/13/2022 3.61 (L)  4.50 - 11.00 K/uL Final    RBC 09/13/2022 4.13 (L)  4.60 - 6.20 M/uL Final    Hemoglobin  09/13/2022 11.9 (L)  13.5 - 18.0 g/dL Final    Hematocrit 09/13/2022 36.2 (L)  40.0 - 54.0 % Final    MCV 09/13/2022 87.7  80.0 - 96.0 fL Final    MCH 09/13/2022 28.8  27.0 - 31.0 pg Final    MCHC 09/13/2022 32.9  32.0 - 36.0 g/dL Final    RDW 09/13/2022 17.3 (H)  11.5 - 14.5 % Final    Platelet Count 09/13/2022 116 (L)  150 - 400 K/uL Final    MPV 09/13/2022 11.7  9.4 - 12.4 fL Final    Neutrophils % 09/13/2022 44.5 (L)  53.0 - 65.0 % Final    Lymphocytes % 09/13/2022 34.9  27.0 - 41.0 % Final    Monocytes % 09/13/2022 13.9 (H)  2.0 - 6.0 % Final    Eosinophils % 09/13/2022 5.3 (H)  1.0 - 4.0 % Final    Basophils % 09/13/2022 1.1 (H)  0.0 - 1.0 % Final    Immature Granulocytes % 09/13/2022 0.3  0.0 - 0.4 % Final    nRBC, Auto 09/13/2022 0.0  <=0.0 % Final    Neutrophils, Abs 09/13/2022 1.61 (L)  1.80 - 7.70 K/uL Final    Lymphocytes, Absolute 09/13/2022 1.26  1.00 - 4.80 K/uL Final    Monocytes, Absolute 09/13/2022 0.50  0.00 - 0.80 K/uL Final    Eosinophils, Absolute 09/13/2022 0.19  0.00 - 0.50 K/uL Final    Basophils, Absolute 09/13/2022 0.04  0.00 - 0.20 K/uL Final    Immature Granulocytes, Absolute 09/13/2022 0.01  0.00 - 0.04 K/uL Final    nRBC, Absolute 09/13/2022 0.00  <=0.00 x10e3/uL Final    Diff Type 09/13/2022 Auto   Final     X-Ray Ribs 2 View Right  Narrative: CR RIBS UNILATERAL RIGHT    Indication: Pleurodynia    Findings: Minimally displaced fracture with small subpleural hematoma  seen involving the right anterolateral fifth rib. No other evidence of  fracture seen.  No pneumothorax is present.  Impression: Impression: Rib fracture as above.    This report has been electronically signed by Isaias Ndiaye  X-Ray Ribs 2 View Right  Narrative: History: Pleurodynia    Date: 1/13/2021     Study: AP and oblique views right ribs    Comparison exam: September 8, 2019 chest x-ray    There is oblique lucency compatible with acute or recent fracture of the  anterolateral aspect of the right fifth rib. There  is equivocal fracture  deformity of the right sixth rib near the same region. There is no focal  lytic or blastic lesion. There is no obvious pneumothorax or pleural  effusion  Impression: Impression: Acute or recent nondisplaced fracture of the anterolateral  aspect right fifth rib    Equivocal nondisplaced right sixth rib fracture    This report has been electronically signed by Sourav Quinn    Assessment and Plan (including Health Maintenance)      Problem List  Smart Sets  Document Outside HM   :    Health Maintenance Due   Topic Date Due    COVID-19 Vaccine (1) Never done    TETANUS VACCINE  Never done    Colorectal Cancer Screening  Never done    Shingles Vaccine (1 of 2) Never done    Influenza Vaccine (1) Never done       Problem List Items Addressed This Visit    None  Visit Diagnoses       Hypertension, unspecified type    -  Primary    Relevant Orders    Lipid Panel (Completed)    CBC Auto Differential (Completed)    Comprehensive Metabolic Panel (Completed)    Anemia, unspecified type        Hypokalemia        Relevant Medications    potassium chloride (KLOR-CON) 10 MEQ TbSR    Gastroesophageal reflux disease without esophagitis        Relevant Medications    pantoprazole (PROTONIX) 40 MG tablet    Other iron deficiency anemia        Relevant Medications    ferrous sulfate 325 (65 FE) MG EC tablet    Other Relevant Orders    Iron and TIBC (Completed)    Vitamin D deficiency        Relevant Medications    ergocalciferol (ERGOCALCIFEROL) 50,000 unit Cap    Other Relevant Orders    Vitamin D (Completed)    Mixed hyperlipidemia        Relevant Medications    atorvastatin (LIPITOR) 10 MG tablet            Health Maintenance Topics with due status: Not Due       Topic Last Completion Date    Lipid Panel 09/13/2022     Refill medications today  Recommend compression hose daily with ambulation  Labs today   Will treat as indicated when labs return  Follow up 6 mo and prn        Signature:  Jud Estrada,  FNP [facility] [address] [place] [address]  [phone]    Date of encounter: 9/13/22

## 2022-12-02 ENCOUNTER — OFFICE VISIT (OUTPATIENT)
Dept: FAMILY MEDICINE | Facility: CLINIC | Age: 52
End: 2022-12-02
Payer: COMMERCIAL

## 2022-12-02 VITALS
TEMPERATURE: 100 F | HEIGHT: 72 IN | SYSTOLIC BLOOD PRESSURE: 138 MMHG | OXYGEN SATURATION: 99 % | BODY MASS INDEX: 21.67 KG/M2 | RESPIRATION RATE: 18 BRPM | WEIGHT: 160 LBS | DIASTOLIC BLOOD PRESSURE: 88 MMHG | HEART RATE: 84 BPM

## 2022-12-02 DIAGNOSIS — J10.1 INFLUENZA A: Primary | ICD-10-CM

## 2022-12-02 DIAGNOSIS — R11.2 NAUSEA AND VOMITING, UNSPECIFIED VOMITING TYPE: ICD-10-CM

## 2022-12-02 LAB
CTP QC/QA: YES
FLUAV AG NPH QL: POSITIVE
FLUBV AG NPH QL: NEGATIVE

## 2022-12-02 PROCEDURE — 1159F MED LIST DOCD IN RCRD: CPT | Mod: ,,, | Performed by: NURSE PRACTITIONER

## 2022-12-02 PROCEDURE — 99213 PR OFFICE/OUTPT VISIT, EST, LEVL III, 20-29 MIN: ICD-10-PCS | Mod: ,,, | Performed by: NURSE PRACTITIONER

## 2022-12-02 PROCEDURE — 3079F PR MOST RECENT DIASTOLIC BLOOD PRESSURE 80-89 MM HG: ICD-10-PCS | Mod: ,,, | Performed by: NURSE PRACTITIONER

## 2022-12-02 PROCEDURE — 3008F PR BODY MASS INDEX (BMI) DOCUMENTED: ICD-10-PCS | Mod: ,,, | Performed by: NURSE PRACTITIONER

## 2022-12-02 PROCEDURE — 1159F PR MEDICATION LIST DOCUMENTED IN MEDICAL RECORD: ICD-10-PCS | Mod: ,,, | Performed by: NURSE PRACTITIONER

## 2022-12-02 PROCEDURE — 99213 OFFICE O/P EST LOW 20 MIN: CPT | Mod: ,,, | Performed by: NURSE PRACTITIONER

## 2022-12-02 PROCEDURE — 87804 INFLUENZA ASSAY W/OPTIC: CPT | Mod: QW,,, | Performed by: NURSE PRACTITIONER

## 2022-12-02 PROCEDURE — 3075F PR MOST RECENT SYSTOLIC BLOOD PRESS GE 130-139MM HG: ICD-10-PCS | Mod: ,,, | Performed by: NURSE PRACTITIONER

## 2022-12-02 PROCEDURE — 1160F PR REVIEW ALL MEDS BY PRESCRIBER/CLIN PHARMACIST DOCUMENTED: ICD-10-PCS | Mod: ,,, | Performed by: NURSE PRACTITIONER

## 2022-12-02 PROCEDURE — 3075F SYST BP GE 130 - 139MM HG: CPT | Mod: ,,, | Performed by: NURSE PRACTITIONER

## 2022-12-02 PROCEDURE — 3008F BODY MASS INDEX DOCD: CPT | Mod: ,,, | Performed by: NURSE PRACTITIONER

## 2022-12-02 PROCEDURE — 3079F DIAST BP 80-89 MM HG: CPT | Mod: ,,, | Performed by: NURSE PRACTITIONER

## 2022-12-02 PROCEDURE — 1160F RVW MEDS BY RX/DR IN RCRD: CPT | Mod: ,,, | Performed by: NURSE PRACTITIONER

## 2022-12-02 PROCEDURE — 87804 POCT INFLUENZA A/B: ICD-10-PCS | Mod: 59,QW,, | Performed by: NURSE PRACTITIONER

## 2022-12-02 RX ORDER — ONDANSETRON 4 MG/1
4 TABLET, ORALLY DISINTEGRATING ORAL EVERY 6 HOURS PRN
Qty: 20 TABLET | Refills: 0 | Status: SHIPPED | OUTPATIENT
Start: 2022-12-02 | End: 2022-12-19

## 2022-12-02 RX ORDER — OSELTAMIVIR PHOSPHATE 75 MG/1
75 CAPSULE ORAL 2 TIMES DAILY
Qty: 10 CAPSULE | Refills: 0 | Status: SHIPPED | OUTPATIENT
Start: 2022-12-02 | End: 2022-12-07

## 2022-12-02 NOTE — ASSESSMENT & PLAN NOTE
Clear liquids for the remainder of today and then advance to a BRAT diet as tolerated. Only resume regular diet once symptoms have fully resolved. Return to clinic or if after hours seek emergent medical attention if unable to tolerate PO fluids, fever, severe abdominal pain, if symptoms are not controlled by medication regimen, or other discussed worsening symptoms. If symptoms persist past 24-48 hours return to clinic for further evaluation. Patient verbalized understanding of treatment plan and denies any question. Patient is to use the Zofran as needed.

## 2022-12-02 NOTE — LETTER
December 2, 2022      [facility] [address]  [place] [address]  Phone: [phone]  Fax: [phone]       Patient: Jesse Lira   YOB: 1970  Date of Visit: 12/02/2022    To Whom It May Concern:    Ronnell Lira  was at [facility] on 12/02/2022. The patient may return to work/school on 12/5/22 with no restrictions. If you have any questions or concerns, or if I can be of further assistance, please do not hesitate to contact me.    Sincerely,    Juju Parker LPN

## 2022-12-02 NOTE — PROGRESS NOTES
Zeynep Rodriguez NP   [place] [id number]  [place]  [address]      PATIENT NAME: Jesse Lira  : 1970  DATE: 22  MRN: [id number]      Billing Provider: Zeynep Rodriguez NP  Level of Service: IN OFFICE/OUTPT VISIT, EST, LEVL III, 20-29 MIN  Patient PCP Information       Provider PCP Type    [facility]JINDER [facility]            Reason for Visit / Chief Complaint: Dizziness, Chills, Fever, Nasal Congestion, and Cough (All symptoms X2 days./Works at [employer]./States people he works with have had flu.)       Update PCP  Update Chief Complaint         History of Present Illness / Problem Focused Workflow     Jesse Lira presents to the clinic with Dizziness, Chills, Fever, Nasal Congestion, and Cough (All symptoms X2 days./Works at [employer]./States people he works with have had flu.)     52 year old male presents to clinic with complaints of  Dizziness, Chills, Fever, Nasal Congestion, Vomiting, and Cough for 2 days now. He reports he works at [employer] and several of his coworkers have tested positive for flu.     Review of Systems     @Review of Systems   Constitutional:  Positive for chills, fatigue and fever. Negative for activity change and appetite change.   HENT:  Positive for nasal congestion, rhinorrhea, sinus pressure/congestion and sore throat. Negative for ear pain.    Eyes:  Negative for pain, redness, visual disturbance and eye dryness.   Respiratory:  Positive for cough. Negative for shortness of breath.    Cardiovascular:  Negative for chest pain and leg swelling.   Gastrointestinal:  Positive for nausea and vomiting. Negative for abdominal distention, abdominal pain, constipation and diarrhea.   Endocrine: Negative for cold intolerance, heat intolerance and polyuria.   Genitourinary:  Negative for bladder incontinence, dysuria, frequency and urgency.   Musculoskeletal:  Positive for myalgias. Negative for arthralgias and gait problem.   Integumentary:  Negative for color  change, rash and wound.   Allergic/Immunologic: Negative for environmental allergies and food allergies.   Neurological:  Positive for dizziness and headaches. Negative for weakness and light-headedness.   Psychiatric/Behavioral:  Negative for behavioral problems and sleep disturbance.      Medical / Social / Family History     Past Medical History:   Diagnosis Date    Anemia     Hyperlipidemia     Hypertension        History reviewed. No pertinent surgical history.    Social History    reports that he has never smoked. He has never used smokeless tobacco. He reports current alcohol use. He reports that he does not use drugs.    Family History  's family history includes No Known Problems in his father and mother.    Medications and Allergies     Medications  Outpatient Medications Marked as Taking for the 12/2/22 encounter (Office Visit) with Zeynep Rodriguez NP   Medication Sig Dispense Refill    atorvastatin (LIPITOR) 10 MG tablet Take 1 tablet (10 mg total) by mouth once daily. 30 tablet 5    chlorthalidone (HYGROTEN) 25 MG Tab Take 25 mg by mouth once daily.      ergocalciferol (ERGOCALCIFEROL) 50,000 unit Cap Take 1 capsule (50,000 Units total) by mouth every 7 days. 4 capsule 5    ferrous sulfate 325 (65 FE) MG EC tablet Take 1 tablet (325 mg total) by mouth 2 (two) times daily. 60 tablet 5    latanoprost 0.005 % ophthalmic solution Place 1 drop into the right eye every evening. 2.5 mL 5    metoprolol succinate (TOPROL-XL) 50 MG 24 hr tablet Take 50 mg by mouth once daily.      pantoprazole (PROTONIX) 40 MG tablet Take 1 tablet (40 mg total) by mouth 2 (two) times daily. 60 tablet 5    potassium chloride (KLOR-CON) 10 MEQ TbSR Take 1 tablet (10 mEq total) by mouth 2 (two) times daily. 60 tablet 5       Allergies  Review of patient's allergies indicates:   Allergen Reactions    Tomato Other (See Comments)    Tomato (solanum lycopersicum)        Physical Examination     Vitals:    12/02/22 0838   BP:  138/88   Pulse: 84   Resp: 18   Temp: 99.7 °F (37.6 °C)     Physical Exam  Vitals and nursing note reviewed.   Constitutional:       Appearance: Normal appearance.   HENT:      Head: Normocephalic.      Right Ear: Tympanic membrane normal.      Left Ear: Tympanic membrane normal.      Nose: Congestion and rhinorrhea present. Rhinorrhea is purulent.      Right Turbinates: Pale.      Left Turbinates: Pale.      Right Sinus: Maxillary sinus tenderness and frontal sinus tenderness present.      Left Sinus: Maxillary sinus tenderness and frontal sinus tenderness present.      Mouth/Throat:      Lips: Pink.      Mouth: Mucous membranes are moist.      Pharynx: Oropharyngeal exudate (clear post nasal drainage.) and posterior oropharyngeal erythema present.   Eyes:      Conjunctiva/sclera: Conjunctivae normal.   Cardiovascular:      Rate and Rhythm: Normal rate and regular rhythm.      Pulses: Normal pulses.      Heart sounds: Normal heart sounds.   Pulmonary:      Effort: Pulmonary effort is normal.      Breath sounds: Normal breath sounds. No wheezing or rhonchi.   Abdominal:      General: Abdomen is flat. Bowel sounds are normal. There is no distension.      Palpations: Abdomen is soft.      Tenderness: There is no abdominal tenderness.   Musculoskeletal:         General: Normal range of motion.      Cervical back: Normal range of motion.   Skin:     General: Skin is warm and dry.      Capillary Refill: Capillary refill takes less than 2 seconds.   Neurological:      General: No focal deficit present.      Mental Status: He is alert and oriented to person, place, and time. Mental status is at baseline.   Psychiatric:         Mood and Affect: Mood normal.         Behavior: Behavior normal.             Lab Results   Component Value Date    WBC 3.61 (L) 09/13/2022    HGB 11.9 (L) 09/13/2022    HCT 36.2 (L) 09/13/2022    MCV 87.7 09/13/2022     (L) 09/13/2022          Sodium   Date Value Ref Range Status   09/13/2022  134 (L) 136 - 145 mmol/L Final     Potassium   Date Value Ref Range Status   09/13/2022 3.6 3.5 - 5.1 mmol/L Final     Chloride   Date Value Ref Range Status   09/13/2022 93 (L) 98 - 107 mmol/L Final     CO2   Date Value Ref Range Status   09/13/2022 31 21 - 32 mmol/L Final     Glucose   Date Value Ref Range Status   09/13/2022 81 74 - 106 mg/dL Final     BUN   Date Value Ref Range Status   09/13/2022 16 7 - 18 mg/dL Final     Creatinine   Date Value Ref Range Status   09/13/2022 1.98 (H) 0.70 - 1.30 mg/dL Final     Calcium   Date Value Ref Range Status   09/13/2022 9.5 8.5 - 10.1 mg/dL Final     Total Protein   Date Value Ref Range Status   09/13/2022 8.8 (H) 6.4 - 8.2 g/dL Final     Albumin   Date Value Ref Range Status   09/13/2022 4.3 3.5 - 5.0 g/dL Final     Bilirubin, Total   Date Value Ref Range Status   09/13/2022 0.8 >0.0 - 1.2 mg/dL Final     Alk Phos   Date Value Ref Range Status   09/13/2022 94 45 - 115 U/L Final     AST   Date Value Ref Range Status   09/13/2022 174 (H) 15 - 37 U/L Final     ALT   Date Value Ref Range Status   09/13/2022 60 16 - 61 U/L Final     Anion Gap   Date Value Ref Range Status   09/13/2022 14 7 - 16 mmol/L Final     eGFR    Date Value Ref Range Status   05/10/2021 127 >=60 mL/min/1.73m² Final     eGFR   Date Value Ref Range Status   04/13/2022 64 >=60 mL/min/1.73m² Final      X-Ray Ribs 2 View Right  Narrative: CR RIBS UNILATERAL RIGHT    Indication: Pleurodynia    Findings: Minimally displaced fracture with small subpleural hematoma  seen involving the right anterolateral fifth rib. No other evidence of  fracture seen.  No pneumothorax is present.  Impression: Impression: Rib fracture as above.    This report has been electronically signed by Isaias Ndiaye  X-Ray Ribs 2 View Right  Narrative: History: Pleurodynia    Date: 1/13/2021     Study: AP and oblique views right ribs    Comparison exam: September 8, 2019 chest x-ray    There is oblique lucency  compatible with acute or recent fracture of the  anterolateral aspect of the right fifth rib. There is equivocal fracture  deformity of the right sixth rib near the same region. There is no focal  lytic or blastic lesion. There is no obvious pneumothorax or pleural  effusion  Impression: Impression: Acute or recent nondisplaced fracture of the anterolateral  aspect right fifth rib    Equivocal nondisplaced right sixth rib fracture    This report has been electronically signed by Sourav Quinn     Procedures   Assessment and Plan (including Health Maintenance)      Problem List  Smart Sets  Document Outside HM   :    Plan:           Problem List Items Addressed This Visit          ID    Influenza A - Primary    Current Assessment & Plan     Reviewed pathology of current symptoms, medication side effects/risk/benefits/directions on taking medications, instructed to alternate OTC Tylenol/Motrin for fever/pain, increase fluid intake, monitor for discussed worsening symptoms that require re-evaluation in clinic or if after hours go to ER. Strict hand hygiene encouraged. Lysol surroundings. Patient is to take the Tamiflu as directed. Patient verbalized understanding of treatment plan and denies any questions.             Relevant Medications    oseltamivir (TAMIFLU) 75 MG capsule    Other Relevant Orders    POCT Influenza A/B (Completed)       GI    Nausea and vomiting    Current Assessment & Plan     Clear liquids for the remainder of today and then advance to a BRAT diet as tolerated. Only resume regular diet once symptoms have fully resolved. Return to clinic or if after hours seek emergent medical attention if unable to tolerate PO fluids, fever, severe abdominal pain, if symptoms are not controlled by medication regimen, or other discussed worsening symptoms. If symptoms persist past 24-48 hours return to clinic for further evaluation. Patient verbalized understanding of treatment plan and denies any question.  Patient is to use the Zofran as needed.            Relevant Medications    ondansetron (ZOFRAN-ODT) 4 MG TbDL       Health Maintenance Topics with due status: Not Due       Topic Last Completion Date    Lipid Panel 09/13/2022       Future Appointments   Date Time Provider Department Center   1/5/2023  8:00 AM HARJINDER Hewitt [facility]        Health Maintenance Due   Topic Date Due    COVID-19 Vaccine (1) Never done    TETANUS VACCINE  Never done    Colorectal Cancer Screening  Never done    Shingles Vaccine (1 of 2) Never done    Influenza Vaccine (1) Never done        No follow-ups on file.     Signature:  Zeynep Rodriguez NP  [place] [id number]  [address]    Date of encounter: 12/2/22

## 2022-12-19 ENCOUNTER — OFFICE VISIT (OUTPATIENT)
Dept: FAMILY MEDICINE | Facility: CLINIC | Age: 52
End: 2022-12-19
Payer: COMMERCIAL

## 2022-12-19 VITALS
RESPIRATION RATE: 20 BRPM | TEMPERATURE: 98 F | WEIGHT: 168 LBS | SYSTOLIC BLOOD PRESSURE: 158 MMHG | DIASTOLIC BLOOD PRESSURE: 98 MMHG | HEIGHT: 72 IN | HEART RATE: 96 BPM | BODY MASS INDEX: 22.75 KG/M2 | OXYGEN SATURATION: 98 %

## 2022-12-19 DIAGNOSIS — E78.2 MIXED HYPERLIPIDEMIA: ICD-10-CM

## 2022-12-19 DIAGNOSIS — R60.0 EDEMA OF LEFT LOWER EXTREMITY: ICD-10-CM

## 2022-12-19 DIAGNOSIS — Z76.0 ENCOUNTER FOR MEDICATION REFILL: ICD-10-CM

## 2022-12-19 DIAGNOSIS — I73.9 PVD (PERIPHERAL VASCULAR DISEASE): ICD-10-CM

## 2022-12-19 DIAGNOSIS — I10 HYPERTENSION, UNSPECIFIED TYPE: Primary | ICD-10-CM

## 2022-12-19 DIAGNOSIS — K21.9 GASTROESOPHAGEAL REFLUX DISEASE WITHOUT ESOPHAGITIS: ICD-10-CM

## 2022-12-19 PROCEDURE — 1160F PR REVIEW ALL MEDS BY PRESCRIBER/CLIN PHARMACIST DOCUMENTED: ICD-10-PCS | Mod: ,,, | Performed by: NURSE PRACTITIONER

## 2022-12-19 PROCEDURE — 99213 PR OFFICE/OUTPT VISIT, EST, LEVL III, 20-29 MIN: ICD-10-PCS | Mod: ,,, | Performed by: NURSE PRACTITIONER

## 2022-12-19 PROCEDURE — 3077F SYST BP >= 140 MM HG: CPT | Mod: ,,, | Performed by: NURSE PRACTITIONER

## 2022-12-19 PROCEDURE — 1159F PR MEDICATION LIST DOCUMENTED IN MEDICAL RECORD: ICD-10-PCS | Mod: ,,, | Performed by: NURSE PRACTITIONER

## 2022-12-19 PROCEDURE — 3080F PR MOST RECENT DIASTOLIC BLOOD PRESSURE >= 90 MM HG: ICD-10-PCS | Mod: ,,, | Performed by: NURSE PRACTITIONER

## 2022-12-19 PROCEDURE — 1160F RVW MEDS BY RX/DR IN RCRD: CPT | Mod: ,,, | Performed by: NURSE PRACTITIONER

## 2022-12-19 PROCEDURE — 3008F PR BODY MASS INDEX (BMI) DOCUMENTED: ICD-10-PCS | Mod: ,,, | Performed by: NURSE PRACTITIONER

## 2022-12-19 PROCEDURE — 99213 OFFICE O/P EST LOW 20 MIN: CPT | Mod: ,,, | Performed by: NURSE PRACTITIONER

## 2022-12-19 PROCEDURE — 3008F BODY MASS INDEX DOCD: CPT | Mod: ,,, | Performed by: NURSE PRACTITIONER

## 2022-12-19 PROCEDURE — 1159F MED LIST DOCD IN RCRD: CPT | Mod: ,,, | Performed by: NURSE PRACTITIONER

## 2022-12-19 PROCEDURE — 3077F PR MOST RECENT SYSTOLIC BLOOD PRESSURE >= 140 MM HG: ICD-10-PCS | Mod: ,,, | Performed by: NURSE PRACTITIONER

## 2022-12-19 PROCEDURE — 3080F DIAST BP >= 90 MM HG: CPT | Mod: ,,, | Performed by: NURSE PRACTITIONER

## 2022-12-19 RX ORDER — CLONIDINE HYDROCHLORIDE 0.2 MG/1
0.2 TABLET ORAL
Status: COMPLETED | OUTPATIENT
Start: 2022-12-19 | End: 2022-12-19

## 2022-12-19 RX ORDER — METOPROLOL SUCCINATE 100 MG/1
100 TABLET, EXTENDED RELEASE ORAL DAILY
Qty: 30 TABLET | Refills: 2 | Status: SHIPPED | OUTPATIENT
Start: 2022-12-19 | End: 2023-03-15

## 2022-12-19 RX ORDER — METHOCARBAMOL 750 MG/1
750 TABLET, FILM COATED ORAL 4 TIMES DAILY PRN
COMMUNITY
Start: 2022-12-16

## 2022-12-19 RX ORDER — PANTOPRAZOLE SODIUM 40 MG/1
40 TABLET, DELAYED RELEASE ORAL 2 TIMES DAILY
Qty: 60 TABLET | Refills: 5 | Status: SHIPPED | OUTPATIENT
Start: 2022-12-19

## 2022-12-19 RX ORDER — TRAMADOL HYDROCHLORIDE 50 MG/1
50 TABLET ORAL EVERY 6 HOURS PRN
COMMUNITY
Start: 2022-12-16

## 2022-12-19 RX ORDER — ATORVASTATIN CALCIUM 10 MG/1
10 TABLET, FILM COATED ORAL DAILY
Qty: 30 TABLET | Refills: 5 | Status: SHIPPED | OUTPATIENT
Start: 2022-12-19 | End: 2023-10-06 | Stop reason: SDUPTHER

## 2022-12-19 RX ADMIN — CLONIDINE HYDROCHLORIDE 0.2 MG: 0.2 TABLET ORAL at 03:12

## 2022-12-27 PROBLEM — R60.0 EDEMA OF LEFT LOWER EXTREMITY: Status: ACTIVE | Noted: 2022-12-27

## 2022-12-27 PROBLEM — I10 HYPERTENSION: Status: ACTIVE | Noted: 2022-12-27

## 2022-12-27 PROBLEM — I73.9 PVD (PERIPHERAL VASCULAR DISEASE): Status: ACTIVE | Noted: 2022-12-27

## 2022-12-27 PROBLEM — E78.2 MIXED HYPERLIPIDEMIA: Status: ACTIVE | Noted: 2022-12-27

## 2022-12-27 PROBLEM — K21.9 GASTROESOPHAGEAL REFLUX DISEASE WITHOUT ESOPHAGITIS: Status: ACTIVE | Noted: 2022-12-27

## 2022-12-27 NOTE — PROGRESS NOTES
HARJINDER Hewitt   [facility]  [address] [address] [address]  [phone]      PATIENT NAME: Jesse Lira  : 1970  DATE: 22  MRN: [id number]      Billing Provider: HARJINDER Hewitt  Level of Service:   Patient PCP Information       Provider PCP Type    [facility]            Reason for Visit / Chief Complaint: Medication Refill and Spasms (Went to [place] ER  w/ LLE edema and spasms./Was given robaxin and ultram./States the meds are working.)       Update PCP  Update Chief Complaint         History of Present Illness / Problem Focused Workflow     52 year old male presents for medication refills  Complaints of having muscle spasms to lower ext  He has been taking robaxin and has improved  Has not been wearing compression hose    Hx of anemia, hyperlipidemia, hypertension      Review of Systems     Review of Systems   Constitutional:  Negative for fatigue and fever.   HENT:  Negative for congestion.    Eyes:  Negative for visual disturbance.   Respiratory:  Negative for cough and shortness of breath.    Cardiovascular:  Positive for leg swelling. Negative for chest pain and palpitations.   Gastrointestinal:  Negative for abdominal pain, diarrhea and nausea.   Endocrine: Negative for cold intolerance and heat intolerance.   Genitourinary:  Negative for difficulty urinating.   Musculoskeletal:  Negative for gait problem.   Neurological:  Negative for dizziness, weakness and headaches.   Psychiatric/Behavioral:  Negative for agitation and dysphoric mood.      Medical / Social / Family History     Past Medical History:   Diagnosis Date    Anemia     Hyperlipidemia     Hypertension        History reviewed. No pertinent surgical history.    Social History    reports that he has never smoked. He has never used smokeless tobacco. He reports current alcohol use. He reports that he does not use drugs.    Family  History  Mr.'s family history includes No Known Problems in his father and mother.    Medications and Allergies     Medications  Outpatient Medications Marked as Taking for the 12/19/22 encounter (Office Visit) with HARJINDER Hewitt   Medication Sig Dispense Refill    chlorthalidone (HYGROTEN) 25 MG Tab Take 25 mg by mouth once daily.      ergocalciferol (ERGOCALCIFEROL) 50,000 unit Cap Take 1 capsule (50,000 Units total) by mouth every 7 days. 4 capsule 5    ferrous sulfate 325 (65 FE) MG EC tablet Take 1 tablet (325 mg total) by mouth 2 (two) times daily. 60 tablet 5    methocarbamoL (ROBAXIN) 750 MG Tab Take 750 mg by mouth 4 (four) times daily as needed.      traMADoL (ULTRAM) 50 mg tablet Take 50 mg by mouth every 6 (six) hours as needed.      [DISCONTINUED] metoprolol succinate (TOPROL-XL) 50 MG 24 hr tablet Take 50 mg by mouth once daily.      [DISCONTINUED] pantoprazole (PROTONIX) 40 MG tablet Take 1 tablet (40 mg total) by mouth 2 (two) times daily. 60 tablet 5       Allergies  Review of patient's allergies indicates:   Allergen Reactions    Tomato Other (See Comments)    Tomato (solanum lycopersicum)        Physical Examination     Vitals:    12/19/22 1641   BP: (!) 158/98   Pulse:    Resp:    Temp:      Physical Exam  Constitutional:       General: He is not in acute distress.  HENT:      Head: Normocephalic.      Nose: Nose normal. No congestion.      Mouth/Throat:      Mouth: Mucous membranes are moist.   Eyes:      Extraocular Movements: Extraocular movements intact.   Cardiovascular:      Rate and Rhythm: Normal rate.   Pulmonary:      Effort: Pulmonary effort is normal. No respiratory distress.   Abdominal:      General: Bowel sounds are normal.      Palpations: Abdomen is soft.   Musculoskeletal:         General: No tenderness. Normal range of motion.      Cervical back: Neck supple.   Skin:     General: Skin is warm.   Neurological:      Mental Status: He is alert and oriented to person, place,  and time.   Psychiatric:         Behavior: Behavior normal.         Imaging / Labs     No visits with results within 1 Day(s) from this visit.   Latest known visit with results is:   Office Visit on 12/02/2022   Component Date Value Ref Range Status    Rapid Influenza A Ag 12/02/2022 Positive (A)  Negative Final    Rapid Influenza B Ag 12/02/2022 Negative  Negative Final     Acceptable 12/02/2022 Yes   Final     X-Ray Ribs 2 View Right  Narrative: CR RIBS UNILATERAL RIGHT    Indication: Pleurodynia    Findings: Minimally displaced fracture with small subpleural hematoma  seen involving the right anterolateral fifth rib. No other evidence of  fracture seen.  No pneumothorax is present.  Impression: Impression: Rib fracture as above.    This report has been electronically signed by Isaias Ndiaye  X-Ray Ribs 2 View Right  Narrative: History: Pleurodynia    Date: 1/13/2021     Study: AP and oblique views right ribs    Comparison exam: September 8, 2019 chest x-ray    There is oblique lucency compatible with acute or recent fracture of the  anterolateral aspect of the right fifth rib. There is equivocal fracture  deformity of the right sixth rib near the same region. There is no focal  lytic or blastic lesion. There is no obvious pneumothorax or pleural  effusion  Impression: Impression: Acute or recent nondisplaced fracture of the anterolateral  aspect right fifth rib    Equivocal nondisplaced right sixth rib fracture    This report has been electronically signed by Sourav Quinn      Assessment and Plan (including Health Maintenance)      Problem List  Smart Sets  Document Outside HM   :    Health Maintenance Due   Topic Date Due    COVID-19 Vaccine (1) Never done    TETANUS VACCINE  Never done    Colorectal Cancer Screening  Never done    Shingles Vaccine (1 of 2) Never done    Influenza Vaccine (1) Never done       Problem List Items Addressed This Visit    None  Visit Diagnoses        Hypertension, unspecified type    -  Primary    Relevant Medications    cloNIDine tablet 0.2 mg (Completed)    metoprolol succinate (TOPROL-XL) 100 MG 24 hr tablet    Gastroesophageal reflux disease without esophagitis        Relevant Medications    pantoprazole (PROTONIX) 40 MG tablet    PVD (peripheral vascular disease)        Relevant Orders    Ambulatory referral/consult to Vascular Medicine    Mixed hyperlipidemia        Relevant Medications    atorvastatin (LIPITOR) 10 MG tablet    Edema of left lower extremity        Relevant Orders    Ambulatory referral/consult to Vascular Medicine            Health Maintenance Topics with due status: Not Due       Topic Last Completion Date    Lipid Panel 09/13/2022     Refill medications today  Elevate lower ext as much as possible  Refer to vascular  Recommend compression hose as prior  Follow up about 1 mo        Signature:  HARJINDER Hewitt  [facility] [address] [place] [address]  [phone]    Date of encounter: 12/19/22

## 2023-01-30 ENCOUNTER — PATIENT OUTREACH (OUTPATIENT)
Dept: ADMINISTRATIVE | Facility: HOSPITAL | Age: 53
End: 2023-01-30

## 2023-03-15 ENCOUNTER — OFFICE VISIT (OUTPATIENT)
Dept: FAMILY MEDICINE | Facility: CLINIC | Age: 53
End: 2023-03-15
Payer: COMMERCIAL

## 2023-03-15 VITALS
SYSTOLIC BLOOD PRESSURE: 122 MMHG | WEIGHT: 163 LBS | RESPIRATION RATE: 18 BRPM | HEART RATE: 85 BPM | HEIGHT: 72 IN | OXYGEN SATURATION: 97 % | DIASTOLIC BLOOD PRESSURE: 84 MMHG | BODY MASS INDEX: 22.08 KG/M2 | TEMPERATURE: 97 F

## 2023-03-15 DIAGNOSIS — R03.0 ELEVATED BLOOD PRESSURE READING: ICD-10-CM

## 2023-03-15 DIAGNOSIS — E87.6 POTASSIUM DEFICIENCY: ICD-10-CM

## 2023-03-15 DIAGNOSIS — H54.40 BLINDNESS OF RIGHT EYE WITH NORMAL VISION IN CONTRALATERAL EYE: ICD-10-CM

## 2023-03-15 DIAGNOSIS — I10 HYPERTENSION, UNSPECIFIED TYPE: Primary | ICD-10-CM

## 2023-03-15 PROCEDURE — 3074F SYST BP LT 130 MM HG: CPT | Mod: ,,, | Performed by: NURSE PRACTITIONER

## 2023-03-15 PROCEDURE — 1159F PR MEDICATION LIST DOCUMENTED IN MEDICAL RECORD: ICD-10-PCS | Mod: ,,, | Performed by: NURSE PRACTITIONER

## 2023-03-15 PROCEDURE — 1159F MED LIST DOCD IN RCRD: CPT | Mod: ,,, | Performed by: NURSE PRACTITIONER

## 2023-03-15 PROCEDURE — 99213 PR OFFICE/OUTPT VISIT, EST, LEVL III, 20-29 MIN: ICD-10-PCS | Mod: ,,, | Performed by: NURSE PRACTITIONER

## 2023-03-15 PROCEDURE — 99213 OFFICE O/P EST LOW 20 MIN: CPT | Mod: ,,, | Performed by: NURSE PRACTITIONER

## 2023-03-15 PROCEDURE — 1160F PR REVIEW ALL MEDS BY PRESCRIBER/CLIN PHARMACIST DOCUMENTED: ICD-10-PCS | Mod: ,,, | Performed by: NURSE PRACTITIONER

## 2023-03-15 PROCEDURE — 1160F RVW MEDS BY RX/DR IN RCRD: CPT | Mod: ,,, | Performed by: NURSE PRACTITIONER

## 2023-03-15 PROCEDURE — 3079F PR MOST RECENT DIASTOLIC BLOOD PRESSURE 80-89 MM HG: ICD-10-PCS | Mod: ,,, | Performed by: NURSE PRACTITIONER

## 2023-03-15 PROCEDURE — 3079F DIAST BP 80-89 MM HG: CPT | Mod: ,,, | Performed by: NURSE PRACTITIONER

## 2023-03-15 PROCEDURE — 3074F PR MOST RECENT SYSTOLIC BLOOD PRESSURE < 130 MM HG: ICD-10-PCS | Mod: ,,, | Performed by: NURSE PRACTITIONER

## 2023-03-15 PROCEDURE — 3008F PR BODY MASS INDEX (BMI) DOCUMENTED: ICD-10-PCS | Mod: ,,, | Performed by: NURSE PRACTITIONER

## 2023-03-15 PROCEDURE — 3008F BODY MASS INDEX DOCD: CPT | Mod: ,,, | Performed by: NURSE PRACTITIONER

## 2023-03-15 RX ORDER — LATANOPROST 50 UG/ML
1 SOLUTION/ DROPS OPHTHALMIC NIGHTLY
Qty: 2.5 ML | Refills: 2 | Status: SHIPPED | OUTPATIENT
Start: 2023-03-15

## 2023-03-15 RX ORDER — METOPROLOL SUCCINATE 200 MG/1
200 TABLET, EXTENDED RELEASE ORAL DAILY
Qty: 30 TABLET | Refills: 2 | Status: SHIPPED | OUTPATIENT
Start: 2023-03-15 | End: 2023-10-06 | Stop reason: SDUPTHER

## 2023-03-15 RX ORDER — LATANOPROST 50 UG/ML
1 SOLUTION/ DROPS OPHTHALMIC NIGHTLY
COMMUNITY
End: 2023-03-15 | Stop reason: SDUPTHER

## 2023-03-15 RX ORDER — POTASSIUM CHLORIDE 750 MG/1
10 TABLET, EXTENDED RELEASE ORAL 2 TIMES DAILY
COMMUNITY
End: 2023-03-15 | Stop reason: SDUPTHER

## 2023-03-15 RX ORDER — POTASSIUM CHLORIDE 750 MG/1
10 TABLET, EXTENDED RELEASE ORAL 2 TIMES DAILY
Qty: 60 TABLET | Refills: 2 | Status: SHIPPED | OUTPATIENT
Start: 2023-03-15

## 2023-03-15 RX ORDER — CLONIDINE HYDROCHLORIDE 0.2 MG/1
0.2 TABLET ORAL
Status: COMPLETED | OUTPATIENT
Start: 2023-03-15 | End: 2023-03-15

## 2023-03-15 RX ADMIN — CLONIDINE HYDROCHLORIDE 0.2 MG: 0.2 TABLET ORAL at 08:03

## 2023-03-15 NOTE — PROGRESS NOTES
HARJINDER Hewitt   [facility]  [address] [address] [address]  [phone]      PATIENT NAME: Jesse Lira  : 1970  DATE: 3/15/23  MRN: [id number]      Billing Provider: HARJINDER Hewitt  Level of Service:   Patient PCP Information       Provider PCP Type    HARJINDER Hewitt General            Reason for Visit / Chief Complaint: Medication Refill (Requesting eye drops and K)       Update PCP  Update Chief Complaint         History of Present Illness / Problem Focused Workflow     52 year old male presents for medication refills  Denies any complaints today  Reports he has appt with opthalmology in May    Hx of anemia, hyperlipidemia, hypertension      Review of Systems     Review of Systems   Constitutional:  Negative for fatigue and fever.   HENT:  Negative for congestion.    Eyes:  Negative for visual disturbance.   Respiratory:  Negative for cough and shortness of breath.    Cardiovascular:  Negative for chest pain and palpitations.   Gastrointestinal:  Negative for abdominal pain, diarrhea and nausea.   Endocrine: Negative for cold intolerance and heat intolerance.   Genitourinary:  Negative for difficulty urinating.   Musculoskeletal:  Negative for gait problem.   Neurological:  Negative for dizziness, weakness and headaches.   Psychiatric/Behavioral:  Negative for agitation and dysphoric mood.      Medical / Social / Family History     Past Medical History:   Diagnosis Date    Anemia     Hyperlipidemia     Hypertension        History reviewed. No pertinent surgical history.    Social History    reports that he has never smoked. He has never used smokeless tobacco. He reports current alcohol use. He reports that he does not use drugs.    Family History  's family history includes No Known Problems in his father and mother.    Medications and Allergies     Medications  Outpatient Medications Marked as Taking for the 3/15/23  encounter (Office Visit) with HARJINDER Hewitt   Medication Sig Dispense Refill    atorvastatin (LIPITOR) 10 MG tablet Take 1 tablet (10 mg total) by mouth once daily. 30 tablet 5    chlorthalidone (HYGROTEN) 25 MG Tab Take 25 mg by mouth once daily.      ergocalciferol (ERGOCALCIFEROL) 50,000 unit Cap Take 1 capsule (50,000 Units total) by mouth every 7 days. 4 capsule 5    ferrous sulfate 325 (65 FE) MG EC tablet Take 1 tablet (325 mg total) by mouth 2 (two) times daily. 60 tablet 5    methocarbamoL (ROBAXIN) 750 MG Tab Take 750 mg by mouth 4 (four) times daily as needed.      pantoprazole (PROTONIX) 40 MG tablet Take 1 tablet (40 mg total) by mouth 2 (two) times daily. 60 tablet 5    traMADoL (ULTRAM) 50 mg tablet Take 50 mg by mouth every 6 (six) hours as needed.      [DISCONTINUED] latanoprost 0.005 % ophthalmic solution Place 1 drop into the right eye every evening.      [DISCONTINUED] metoprolol succinate (TOPROL-XL) 100 MG 24 hr tablet Take 1 tablet (100 mg total) by mouth once daily. 30 tablet 2    [DISCONTINUED] potassium chloride (KLOR-CON) 10 MEQ TbSR Take 10 mEq by mouth 2 (two) times daily.         Allergies  Review of patient's allergies indicates:   Allergen Reactions    Tomato Other (See Comments)    Tomato (solanum lycopersicum)        Physical Examination     Vitals:    03/15/23 0910   BP: 122/84   Pulse:    Resp:    Temp:      Physical Exam  Constitutional:       General: He is not in acute distress.  HENT:      Head: Normocephalic.      Nose: Nose normal. No congestion.      Mouth/Throat:      Mouth: Mucous membranes are moist.   Eyes:      Extraocular Movements: Extraocular movements intact.   Cardiovascular:      Rate and Rhythm: Normal rate.   Pulmonary:      Effort: Pulmonary effort is normal. No respiratory distress.   Abdominal:      General: Bowel sounds are normal.      Palpations: Abdomen is soft.   Musculoskeletal:         General: No tenderness. Normal range of motion.      Cervical  back: Neck supple.   Skin:     General: Skin is warm.   Neurological:      Mental Status: He is alert and oriented to person, place, and time.   Psychiatric:         Behavior: Behavior normal.         Imaging / Labs     No visits with results within 1 Day(s) from this visit.   Latest known visit with results is:   Patient Outreach on 01/30/2023   Component Date Value Ref Range Status    CRC Recommendation External 09/04/2015 Repeat colonoscopy in 10 years   Final     X-Ray Ribs 2 View Right  Narrative: CR RIBS UNILATERAL RIGHT    Indication: Pleurodynia    Findings: Minimally displaced fracture with small subpleural hematoma  seen involving the right anterolateral fifth rib. No other evidence of  fracture seen.  No pneumothorax is present.  Impression: Impression: Rib fracture as above.    This report has been electronically signed by Isaias Ndiaye  X-Ray Ribs 2 View Right  Narrative: History: Pleurodynia    Date: 1/13/2021     Study: AP and oblique views right ribs    Comparison exam: September 8, 2019 chest x-ray    There is oblique lucency compatible with acute or recent fracture of the  anterolateral aspect of the right fifth rib. There is equivocal fracture  deformity of the right sixth rib near the same region. There is no focal  lytic or blastic lesion. There is no obvious pneumothorax or pleural  effusion  Impression: Impression: Acute or recent nondisplaced fracture of the anterolateral  aspect right fifth rib    Equivocal nondisplaced right sixth rib fracture    This report has been electronically signed by Sourav Quinn      Assessment and Plan (including Health Maintenance)      Problem List  Smart Sets  Document Outside HM   :    Health Maintenance Due   Topic Date Due    COVID-19 Vaccine (1) Never done    TETANUS VACCINE  Never done    Shingles Vaccine (1 of 2) Never done    Influenza Vaccine (1) Never done       Problem List Items Addressed This Visit          Ophtho    Blindness of right eye  with normal vision in contralateral eye    Relevant Medications    latanoprost 0.005 % ophthalmic solution       Cardiac/Vascular    Hypertension - Primary    Relevant Medications    metoprolol succinate (TOPROL-XL) 200 MG 24 hr tablet    Elevated blood pressure reading       Renal/    Potassium deficiency    Relevant Medications    potassium chloride (KLOR-CON) 10 MEQ TbSR       Health Maintenance Topics with due status: Not Due       Topic Last Completion Date    Colorectal Cancer Screening 09/04/2015    Lipid Panel 09/13/2022   Clonidine given in clinic; blood pressure down to 122/84  Refill medications today  Recommend compression hose as prior  Increase metoprolol to 200 mg  Follow up about 1 mo        Signature:  HARJINDER Hewitt  [facility] [address] [place] [address]  [phone]    Date of encounter: 3/15/23

## 2023-03-20 PROBLEM — H54.40 BLINDNESS OF RIGHT EYE WITH NORMAL VISION IN CONTRALATERAL EYE: Status: ACTIVE | Noted: 2023-03-20

## 2023-03-20 PROBLEM — R03.0 ELEVATED BLOOD PRESSURE READING: Status: ACTIVE | Noted: 2023-03-20

## 2023-03-20 PROBLEM — E87.6 POTASSIUM DEFICIENCY: Status: ACTIVE | Noted: 2023-03-20

## 2023-10-06 DIAGNOSIS — I10 HYPERTENSION, UNSPECIFIED TYPE: ICD-10-CM

## 2023-10-06 DIAGNOSIS — E78.2 MIXED HYPERLIPIDEMIA: ICD-10-CM

## 2023-10-09 RX ORDER — ATORVASTATIN CALCIUM 10 MG/1
10 TABLET, FILM COATED ORAL DAILY
Qty: 30 TABLET | Refills: 0 | Status: SHIPPED | OUTPATIENT
Start: 2023-10-09 | End: 2023-10-16 | Stop reason: SDUPTHER

## 2023-10-09 RX ORDER — METOPROLOL SUCCINATE 200 MG/1
200 TABLET, EXTENDED RELEASE ORAL DAILY
Qty: 30 TABLET | Refills: 0 | Status: SHIPPED | OUTPATIENT
Start: 2023-10-09 | End: 2023-10-16 | Stop reason: SDUPTHER

## 2023-10-16 ENCOUNTER — OFFICE VISIT (OUTPATIENT)
Dept: FAMILY MEDICINE | Facility: CLINIC | Age: 53
End: 2023-10-16
Payer: COMMERCIAL

## 2023-10-16 VITALS
SYSTOLIC BLOOD PRESSURE: 154 MMHG | RESPIRATION RATE: 18 BRPM | OXYGEN SATURATION: 99 % | DIASTOLIC BLOOD PRESSURE: 98 MMHG | TEMPERATURE: 98 F | BODY MASS INDEX: 22.08 KG/M2 | WEIGHT: 163 LBS | HEART RATE: 98 BPM | HEIGHT: 72 IN

## 2023-10-16 DIAGNOSIS — Z13.1 SCREENING FOR DIABETES MELLITUS: ICD-10-CM

## 2023-10-16 DIAGNOSIS — Z00.01 ENCOUNTER FOR GENERAL ADULT MEDICAL EXAMINATION WITH ABNORMAL FINDINGS: Primary | ICD-10-CM

## 2023-10-16 DIAGNOSIS — I10 HYPERTENSION, UNSPECIFIED TYPE: ICD-10-CM

## 2023-10-16 DIAGNOSIS — Z13.220 SCREENING FOR LIPID DISORDERS: ICD-10-CM

## 2023-10-16 DIAGNOSIS — E78.2 MIXED HYPERLIPIDEMIA: ICD-10-CM

## 2023-10-16 PROBLEM — L02.212 ABSCESS OF BACK: Status: RESOLVED | Noted: 2021-08-19 | Resolved: 2023-10-16

## 2023-10-16 PROBLEM — R11.2 NAUSEA AND VOMITING: Status: RESOLVED | Noted: 2022-12-02 | Resolved: 2023-10-16

## 2023-10-16 PROBLEM — J10.1 INFLUENZA A: Status: RESOLVED | Noted: 2022-12-02 | Resolved: 2023-10-16

## 2023-10-16 PROCEDURE — 3080F DIAST BP >= 90 MM HG: CPT | Mod: ,,, | Performed by: NURSE PRACTITIONER

## 2023-10-16 PROCEDURE — 3008F BODY MASS INDEX DOCD: CPT | Mod: ,,, | Performed by: NURSE PRACTITIONER

## 2023-10-16 PROCEDURE — 99396 PR PREVENTIVE VISIT,EST,40-64: ICD-10-PCS | Mod: ,,, | Performed by: NURSE PRACTITIONER

## 2023-10-16 PROCEDURE — 1160F RVW MEDS BY RX/DR IN RCRD: CPT | Mod: ,,, | Performed by: NURSE PRACTITIONER

## 2023-10-16 PROCEDURE — 3008F PR BODY MASS INDEX (BMI) DOCUMENTED: ICD-10-PCS | Mod: ,,, | Performed by: NURSE PRACTITIONER

## 2023-10-16 PROCEDURE — 3077F SYST BP >= 140 MM HG: CPT | Mod: ,,, | Performed by: NURSE PRACTITIONER

## 2023-10-16 PROCEDURE — 1159F MED LIST DOCD IN RCRD: CPT | Mod: ,,, | Performed by: NURSE PRACTITIONER

## 2023-10-16 PROCEDURE — 3080F PR MOST RECENT DIASTOLIC BLOOD PRESSURE >= 90 MM HG: ICD-10-PCS | Mod: ,,, | Performed by: NURSE PRACTITIONER

## 2023-10-16 PROCEDURE — 1159F PR MEDICATION LIST DOCUMENTED IN MEDICAL RECORD: ICD-10-PCS | Mod: ,,, | Performed by: NURSE PRACTITIONER

## 2023-10-16 PROCEDURE — 3077F PR MOST RECENT SYSTOLIC BLOOD PRESSURE >= 140 MM HG: ICD-10-PCS | Mod: ,,, | Performed by: NURSE PRACTITIONER

## 2023-10-16 PROCEDURE — 1160F PR REVIEW ALL MEDS BY PRESCRIBER/CLIN PHARMACIST DOCUMENTED: ICD-10-PCS | Mod: ,,, | Performed by: NURSE PRACTITIONER

## 2023-10-16 PROCEDURE — 99396 PREV VISIT EST AGE 40-64: CPT | Mod: ,,, | Performed by: NURSE PRACTITIONER

## 2023-10-16 RX ORDER — CHLORTHALIDONE 25 MG/1
25 TABLET ORAL DAILY
Qty: 90 TABLET | Refills: 1 | Status: SHIPPED | OUTPATIENT
Start: 2023-10-16

## 2023-10-16 RX ORDER — METOPROLOL SUCCINATE 200 MG/1
200 TABLET, EXTENDED RELEASE ORAL DAILY
Qty: 90 TABLET | Refills: 1 | Status: SHIPPED | OUTPATIENT
Start: 2023-10-16 | End: 2023-12-14 | Stop reason: SDUPTHER

## 2023-10-16 RX ORDER — CLONIDINE HYDROCHLORIDE 0.2 MG/1
0.2 TABLET ORAL
Status: COMPLETED | OUTPATIENT
Start: 2023-10-16 | End: 2023-10-16

## 2023-10-16 RX ORDER — CLONIDINE HYDROCHLORIDE 0.1 MG/1
0.1 TABLET ORAL
Status: COMPLETED | OUTPATIENT
Start: 2023-10-16 | End: 2023-10-16

## 2023-10-16 RX ORDER — ATORVASTATIN CALCIUM 10 MG/1
10 TABLET, FILM COATED ORAL DAILY
Qty: 90 TABLET | Refills: 1 | Status: SHIPPED | OUTPATIENT
Start: 2023-10-16 | End: 2024-10-15

## 2023-10-16 RX ADMIN — CLONIDINE HYDROCHLORIDE 0.1 MG: 0.1 TABLET ORAL at 09:10

## 2023-10-16 RX ADMIN — CLONIDINE HYDROCHLORIDE 0.2 MG: 0.2 TABLET ORAL at 08:10

## 2023-10-16 NOTE — PROGRESS NOTES
Subjective     Patient ID: Jesse Lira is a 53 y.o. male.    Chief Complaint: Healthy You (Pt states he has eaten crackers this morning. ) and Medication Refill    53 year old male presents for healthy you  He has been out of meds for several days  Meds were sent to pharmacy; states he did not know they were sent in and did not pick them up       Review of Systems   Constitutional:  Negative for fatigue and fever.   HENT:  Negative for dental problem.    Eyes:  Positive for visual disturbance (vision loss right eye).   Respiratory:  Negative for cough and shortness of breath.    Cardiovascular:  Negative for palpitations.   Gastrointestinal:  Negative for abdominal pain, constipation and diarrhea.   Endocrine: Negative for polydipsia and polyuria.   Musculoskeletal:  Positive for arthralgias. Negative for gait problem.   Allergic/Immunologic: Negative for environmental allergies.   Neurological:  Negative for dizziness, weakness and headaches.   Psychiatric/Behavioral:  Negative for agitation and dysphoric mood.        Tobacco Use: Low Risk  (10/16/2023)    Patient History     Smoking Tobacco Use: Never     Smokeless Tobacco Use: Never     Passive Exposure: Not on file     Review of patient's allergies indicates:   Allergen Reactions    Tomato Other (See Comments)    Tomato (solanum lycopersicum)      Current Outpatient Medications   Medication Instructions    atorvastatin (LIPITOR) 10 mg, Oral, Daily    chlorthalidone (HYGROTEN) 25 mg, Oral, Daily    ergocalciferol (ERGOCALCIFEROL) 50,000 Units, Oral, Every 7 days    ferrous sulfate 325 mg, Oral, 2 times daily    latanoprost 0.005 % ophthalmic solution 1 drop, Right Eye, Nightly    methocarbamoL (ROBAXIN) 750 mg, Oral, 4 times daily PRN    metoprolol succinate (TOPROL-XL) 200 mg, Oral, Daily    pantoprazole (PROTONIX) 40 mg, Oral, 2 times daily    potassium chloride (KLOR-CON) 10 MEQ TbSR 10 mEq, Oral, 2 times daily    traMADoL (ULTRAM) 50 mg, Oral, Every 6 hours  PRN     Medications Discontinued During This Encounter   Medication Reason    chlorthalidone (HYGROTEN) 25 MG Tab Reorder    atorvastatin (LIPITOR) 10 MG tablet Reorder    metoprolol succinate (TOPROL-XL) 200 MG 24 hr tablet Reorder       Past Medical History:   Diagnosis Date    Anemia     Hyperlipidemia     Hypertension      Health Maintenance Topics with due status: Not Due       Topic Last Completion Date    Colorectal Cancer Screening 09/04/2015    Lipid Panel 09/13/2022       There is no immunization history on file for this patient.    Objective     Body mass index is 22.11 kg/m².  Wt Readings from Last 3 Encounters:   10/16/23 73.9 kg (163 lb)   03/15/23 73.9 kg (163 lb)   12/19/22 76.2 kg (168 lb)     Ht Readings from Last 3 Encounters:   10/16/23 6' (1.829 m)   03/15/23 6' (1.829 m)   12/19/22 6' (1.829 m)     BP Readings from Last 3 Encounters:   10/16/23 (!) 154/98   03/15/23 122/84   12/19/22 (!) 158/98     Temp Readings from Last 3 Encounters:   10/16/23 98 °F (36.7 °C) (Temporal)   03/15/23 97.3 °F (36.3 °C)   12/19/22 98 °F (36.7 °C)     Pulse Readings from Last 3 Encounters:   10/16/23 98   03/15/23 85   12/19/22 96     Resp Readings from Last 3 Encounters:   10/16/23 18   03/15/23 18   12/19/22 20     PF Readings from Last 3 Encounters:   No data found for PF       Physical Exam  Constitutional:       General: He is not in acute distress.  HENT:      Head: Normocephalic.      Nose: Nose normal.      Mouth/Throat:      Mouth: Mucous membranes are moist.   Eyes:      Extraocular Movements: Extraocular movements intact.   Cardiovascular:      Rate and Rhythm: Normal rate.   Pulmonary:      Effort: Pulmonary effort is normal. No respiratory distress.   Abdominal:      General: Bowel sounds are normal.      Palpations: Abdomen is soft.   Musculoskeletal:         General: Normal range of motion.      Cervical back: Normal range of motion.   Skin:     General: Skin is warm.   Neurological:      Mental  Status: He is alert.   Psychiatric:         Behavior: Behavior normal.         Assessment and Plan     Problem List Items Addressed This Visit          Cardiac/Vascular    Hypertension     Blood pressure 190/110 upon arrival to clinic reports he has been out of medicine for several days  Reading down to 154/98 following 0.2 mg of clonidine  Advised him to take meds when he picks them up today  Return for fasting labs  Refill meds          Relevant Medications    metoprolol succinate (TOPROL-XL) 200 MG 24 hr tablet    chlorthalidone (HYGROTEN) 25 MG Tab    Other Relevant Orders    CBC Auto Differential    Comprehensive Metabolic Panel    Mixed hyperlipidemia     Return for fasting labs   Refill meds today         Relevant Medications    atorvastatin (LIPITOR) 10 MG tablet    Other Relevant Orders    CBC Auto Differential    Comprehensive Metabolic Panel       Other    Encounter for general adult medical examination with abnormal findings - Primary     Hx and meds reviewed today  He has been out of meds for several days          Other Visit Diagnoses       Screening for lipid disorders        Relevant Orders    Lipid Panel    Screening for diabetes mellitus        Relevant Orders    Glucose, Fasting            Plan: refill meds today  Return for labs when fasting  Follow up 6 mo      I have reviewed the medications, allergies, and problem list.     Goal Actions:    What type of visit is the patient here for today?: Healthy You  Does the patient consent to enroll in [facility] Healthy?: Yes  Is this a Wellness Follow Up?: No  What is your overall wellness goal? (select at least one): Improve overall health  Choose 3: Biometric, Nutrition, Lifestyle  Biometric Actions: Attend regularly scheduled office visits  Lifestyle Actions : Take medications as prescribed  Nurtrition Actions: Decrease intake of fast food

## 2023-10-16 NOTE — ASSESSMENT & PLAN NOTE
Blood pressure 190/110 upon arrival to clinic reports he has been out of medicine for several days  Reading down to 154/98 following 0.2 mg of clonidine  Advised him to take meds when he picks them up today  Return for fasting labs  Refill meds

## 2023-10-16 NOTE — PROGRESS NOTES
Reviewed care gaps with pt.   Health Maintenance Due   Topic Date Due    COVID-19 Vaccine (1) Never done    TETANUS VACCINE  Never done    Shingles Vaccine (1 of 2) Never done    Influenza Vaccine (1) Never done     P)t would like to wait and receive flu vaccine when we also have covid vaccine available.   Pt is not interested in shingles or tetanus vaccine.

## 2023-10-16 NOTE — PATIENT INSTRUCTIONS
"Patient Education       High Blood Pressure in Adults   The Basics   Written by the doctors and editors at [facility]   What is high blood pressure? -- High blood pressure is a condition that puts you at risk for heart attack, stroke, and kidney disease. It does not usually cause symptoms. But it can be serious.  When your doctor or nurse tells you your blood pressure, they will say 2 numbers. For instance, your doctor or nurse might say that your blood pressure is "130 over 80." The top number is the pressure inside your arteries when your heart is fabian. The bottom number is the pressure inside your arteries when your heart is relaxed.  "Elevated blood pressure" is a term doctors or nurses use as a warning. People with elevated blood pressure do not yet have high blood pressure. But their blood pressure is not as low as it should be for good health.  Many experts define high, elevated, and normal blood pressure as follows:  High - Top number of 130 or above and/or bottom number of 80 or above  Elevated - Top number between 120 and 129 and bottom number of 79 or below  Normal - Top number of 119 or below and bottom number of 79 or below  This information is also in the table (table 1).   How can I lower my blood pressure? -- If your doctor or nurse has prescribed blood pressure medicine, the most important thing you can do is to take it. If it causes side effects, do not just stop taking it. Instead, talk to your doctor or nurse about the problems it causes. They might be able to lower your dose or switch you to another medicine. If cost is a problem, mention that too. They might be able to put you on a less expensive medicine. Taking your blood pressure medicine can keep you from having a heart attack or stroke, and it can save your life!  Can I do anything on my own? -- You have a lot of control over your blood pressure. To lower it:  Lose weight (if you are overweight)  Choose a diet low in fat and rich in " "fruits, vegetables, and low-fat dairy products  Reduce the amount of salt you eat  Do something active for at least 30 minutes a day on most days of the week  Cut down on alcohol (if you drink more than 2 alcoholic drinks per day)  It's also a good idea to get a home blood pressure meter. People who check their own blood pressure at home do better at keeping it low and can sometimes even reduce the amount of medicine they take.  All topics are updated as new evidence becomes available and our peer review process is complete.  This topic retrieved from [employer] on: Sep 21, 2021.  Topic 77826 Version 15.0  Release: 29.4.2 - C29.263  © 2021 UpToDate, Inc. and/or its affiliates. All rights reserved.  table 1: Definition of normal and high blood pressure  Level  Top number  Bottom number    High 130 or above 80 or above   Elevated 120 to 129 79 or below   Normal 119 or below 79 or below   These definitions are from the American College of Cardiology/American Heart Association. Other expert groups might use slightly different definitions.  "Elevated blood pressure" is a term doctor or nurses use as a warning. It means you do not yet have high blood pressure, but your blood pressure is not as low as it should be for good health.  Graphic 56871 Version 6.0  Consumer Information Use and Disclaimer   This information is not specific medical advice and does not replace information you receive from your health care provider. This is only a brief summary of general information. It does NOT include all information about conditions, illnesses, injuries, tests, procedures, treatments, therapies, discharge instructions or life-style choices that may apply to you. You must talk with your health care provider for complete information about your health and treatment options. This information should not be used to decide whether or not to accept your health care provider's advice, instructions or recommendations. Only your health care " provider has the knowledge and training to provide advice that is right for you. The use of this information is governed by the [employer] End User License Agreement, available at https://www.[email or web address]/solutions/[employer]/about/bindu.The use of [employer] content is governed by the [employer] Terms of Use. ©2021 UpToDate, Inc. All rights reserved.  Copyright   © 2021 UpToDate, Inc. and/or its affiliates. All rights reserved.    Patient Education       Diet and Health   The Basics   Written by the doctors and editors at [facility]   Why is it important to eat a healthy diet? -- It's important to eat a healthy diet because eating the right foods can keep you healthy now and later on in life.  Which foods are especially healthy? -- Foods that are especially healthy include:  Fruits and vegetables - Eating a diet with lots of fruits and vegetables can help prevent heart disease and strokes. It might also help prevent certain types of cancers. Try to eat fruits and vegetables at each meal and also for snacks. If you don't have fresh fruits and vegetables available, you can eat frozen or canned ones instead. Doctors recommend eating at least 2 1/2 servings of vegetables and 2 servings of fruits each day.  Foods with fiber - Eating foods with a lot of fiber can help prevent heart disease and strokes. If you have type 2 diabetes, it can also help control your blood sugar. Foods that have a lot of fiber include vegetables, fruits, beans, nuts, oatmeal, and whole grain breads and cereals. You can tell how much fiber is in a food by reading the nutrition label (figure 1). Doctors recommend eating 25 to 36 grams of fiber each day.  Foods with folate (also called folic acid) - Folate is a vitamin that is important for pregnant people, since it helps prevent certain birth defects. Anyone who could get pregnant should get at least 400 micrograms of folic acid daily, whether or not they are actively trying to get pregnant. Folate  "is found in many breakfast cereals, oranges, orange juice, and green leafy vegetables.  Foods with calcium and vitamin D - Babies, children, and adults need calcium and vitamin D to help keep their bones strong. Adults also need calcium and vitamin D to help prevent osteoporosis. Osteoporosis is a condition that causes bones to get "thin" and break more easily than usual. Different foods and drinks have calcium and vitamin D in them (figure 2). People who don't get enough calcium and vitamin D in their diet might need to take a supplement.  Foods with protein - Protein helps your muscles stay strong. Healthy foods with a lot of protein include chicken, fish, eggs, beans, nuts, and soy products. Red meat also has a lot of protein, but it also contains fats, which can be unhealthy.  Some experts recommend a "Mediterranean diet." This involves eating a lot of fruits, vegetables, nuts, whole grains, and olive oil. It also includes some fish, poultry, and dairy products, but not a lot of red meat. Eating this way can help your overall health, and might even lower your risk of having a stroke.  What foods should I avoid or limit? -- To eat a healthy diet, there are some things you should avoid or limit. They include:   Fats - There are different types of fats. Some types of fats are better for your body than others.  Trans fats are especially unhealthy. They are found in margarines, many fast foods, and some store-bought baked goods. Trans fats can raise your cholesterol level and your increase your chance of getting heart disease. You should avoid eating foods with these types of fats.  The type of "polyunsaturated" fats found in fish seems to be healthy and can reduce your chance of getting heart disease. Other polyunsaturated fats might also be good for your health. When you cook, it's best to use oils with healthier fats, such as olive oil and canola oil.  Sugar - To have a healthy diet, it's important to limit or " "avoid sugar, sweets, and refined grains. Refined grains are found in white bread, white rice, most forms of pasta, and most packaged "snack" foods. Whole grains, such as whole-wheat bread and brown rice, have more fiber and are better for your health.  Avoiding sugar-sweetened beverages, like soda and sports drinks, can also help improve your health.  Red meat - Studies have shown that eating a lot of red meat can increase your risk of certain health problems, including heart disease and cancer. You should limit the amount of red meat that you eat.  Can I drink alcohol as part of a healthy diet? -- People who drink a small amount of alcohol each day might have a lower chance of getting heart disease. But drinking alcohol can lead to problems. For example, it can raise a person's chances of getting liver disease and certain types of cancers. In women, even 1 drink a day can increase the risk of getting breast cancer.  Most doctors recommend that adult women not have more than 1 drink a day and that adult men not have more than 2 drinks a day. The limits are different because most women's bodies take longer to break down alcohol.  How many calories do I need each day? -- The number of calories you need each day depends on your weight, height, age, sex, and how active you are.  Your doctor or nurse can tell you how many calories you should eat each day. If you are trying to lose weight, you should eat fewer calories each day.  What if I have questions? -- If you have questions about which foods you should or should not eat, ask your doctor or nurse. The right diet for you will depend, in part, on your health and any medical conditions you have.  All topics are updated as new evidence becomes available and our peer review process is complete.  This topic retrieved from [employer] on: Sep 21, 2021.  Topic 49917 Version 20.0  Release: 29.4.2 - C29.263  © 2021 UpToDate, Inc. and/or its affiliates. All rights " "reserved.  figure 1: Nutrition label - fiber     This is an example of a nutrition label. To figure out how much fiber is in a food, look for the line that says "Dietary Fiber." It's also important to look at the serving size. This food has 7 grams of fiber in each serving, and each serving is 1 cup.  Graphic 23101 Version 7.0    figure 2: Foods and drinks with calcium and vitamin D     Foods rich in calcium include ice cream, soy milk, breads, kale, broccoli, milk, cheese, cottage cheese, almonds, yogurt, ready-to-eat cereals, beans, and tofu. Foods rich in vitamin D include milk, fortified plant-based "milks" (soy, almond), canned tuna fish, cod liver oil, yogurt, ready-to-eat-cereals, cooked salmon, canned sardines, mackerel, and eggs. Some of these foods are rich in both.  Graphic 34348 Version 4.0    Consumer Information Use and Disclaimer   This information is not specific medical advice and does not replace information you receive from your health care provider. This is only a brief summary of general information. It does NOT include all information about conditions, illnesses, injuries, tests, procedures, treatments, therapies, discharge instructions or life-style choices that may apply to you. You must talk with your health care provider for complete information about your health and treatment options. This information should not be used to decide whether or not to accept your health care provider's advice, instructions or recommendations. Only your health care provider has the knowledge and training to provide advice that is right for you. The use of this information is governed by the [employer] End User License Agreement, available at https://[email or web address]/en/solutions/[employer]/about/bindu.The use of [employer] content is governed by the [employer] Terms of Use. ©2021 UpToDate, Inc. All rights reserved.  Copyright   © 2021 UpToDate, Inc. and/or its affiliates. All rights reserved.    "

## 2023-10-17 ENCOUNTER — PATIENT OUTREACH (OUTPATIENT)
Dept: ADMINISTRATIVE | Facility: HOSPITAL | Age: 53
End: 2023-10-17

## 2023-10-17 NOTE — PROGRESS NOTES
Post visit Population Health review of encounter with date of service  10/16 with Natalie.  All required HY components in encounter.    Sent provider pool message regarding importance of getting labs within 14 days

## 2023-12-14 ENCOUNTER — OFFICE VISIT (OUTPATIENT)
Dept: FAMILY MEDICINE | Facility: CLINIC | Age: 53
End: 2023-12-14
Payer: COMMERCIAL

## 2023-12-14 VITALS
SYSTOLIC BLOOD PRESSURE: 124 MMHG | HEIGHT: 72 IN | TEMPERATURE: 98 F | OXYGEN SATURATION: 99 % | WEIGHT: 158.38 LBS | RESPIRATION RATE: 18 BRPM | BODY MASS INDEX: 21.45 KG/M2 | DIASTOLIC BLOOD PRESSURE: 80 MMHG | HEART RATE: 80 BPM

## 2023-12-14 DIAGNOSIS — R11.2 NAUSEA AND VOMITING, UNSPECIFIED VOMITING TYPE: ICD-10-CM

## 2023-12-14 DIAGNOSIS — M79.641 RIGHT HAND PAIN: ICD-10-CM

## 2023-12-14 DIAGNOSIS — R42 DIZZINESS: ICD-10-CM

## 2023-12-14 DIAGNOSIS — I10 HYPERTENSION, UNSPECIFIED TYPE: ICD-10-CM

## 2023-12-14 DIAGNOSIS — Z09 HOSPITAL DISCHARGE FOLLOW-UP: Primary | ICD-10-CM

## 2023-12-14 LAB
CTP QC/QA: YES
FLUAV AG NPH QL: NEGATIVE
FLUBV AG NPH QL: NEGATIVE
SARS-COV-2 AG RESP QL IA.RAPID: NEGATIVE

## 2023-12-14 PROCEDURE — 1160F PR REVIEW ALL MEDS BY PRESCRIBER/CLIN PHARMACIST DOCUMENTED: ICD-10-PCS | Mod: ,,, | Performed by: NURSE PRACTITIONER

## 2023-12-14 PROCEDURE — 87428 POCT SARS-COV2 (COVID) WITH FLU ANTIGEN: ICD-10-PCS | Mod: QW,,, | Performed by: NURSE PRACTITIONER

## 2023-12-14 PROCEDURE — 3079F PR MOST RECENT DIASTOLIC BLOOD PRESSURE 80-89 MM HG: ICD-10-PCS | Mod: ,,, | Performed by: NURSE PRACTITIONER

## 2023-12-14 PROCEDURE — 3079F DIAST BP 80-89 MM HG: CPT | Mod: ,,, | Performed by: NURSE PRACTITIONER

## 2023-12-14 PROCEDURE — 99214 OFFICE O/P EST MOD 30 MIN: CPT | Mod: ,,, | Performed by: NURSE PRACTITIONER

## 2023-12-14 PROCEDURE — 1159F MED LIST DOCD IN RCRD: CPT | Mod: ,,, | Performed by: NURSE PRACTITIONER

## 2023-12-14 PROCEDURE — 3074F SYST BP LT 130 MM HG: CPT | Mod: ,,, | Performed by: NURSE PRACTITIONER

## 2023-12-14 PROCEDURE — 99214 PR OFFICE/OUTPT VISIT, EST, LEVL IV, 30-39 MIN: ICD-10-PCS | Mod: ,,, | Performed by: NURSE PRACTITIONER

## 2023-12-14 PROCEDURE — 1160F RVW MEDS BY RX/DR IN RCRD: CPT | Mod: ,,, | Performed by: NURSE PRACTITIONER

## 2023-12-14 PROCEDURE — 3008F BODY MASS INDEX DOCD: CPT | Mod: ,,, | Performed by: NURSE PRACTITIONER

## 2023-12-14 PROCEDURE — 87428 SARSCOV & INF VIR A&B AG IA: CPT | Mod: QW,,, | Performed by: NURSE PRACTITIONER

## 2023-12-14 PROCEDURE — 3074F PR MOST RECENT SYSTOLIC BLOOD PRESSURE < 130 MM HG: ICD-10-PCS | Mod: ,,, | Performed by: NURSE PRACTITIONER

## 2023-12-14 PROCEDURE — 3008F PR BODY MASS INDEX (BMI) DOCUMENTED: ICD-10-PCS | Mod: ,,, | Performed by: NURSE PRACTITIONER

## 2023-12-14 PROCEDURE — 1159F PR MEDICATION LIST DOCUMENTED IN MEDICAL RECORD: ICD-10-PCS | Mod: ,,, | Performed by: NURSE PRACTITIONER

## 2023-12-14 RX ORDER — MECLIZINE HCL 12.5 MG 12.5 MG/1
12.5 TABLET ORAL 3 TIMES DAILY PRN
Qty: 30 TABLET | Refills: 2 | Status: SHIPPED | OUTPATIENT
Start: 2023-12-14

## 2023-12-14 RX ORDER — METOPROLOL SUCCINATE 200 MG/1
200 TABLET, EXTENDED RELEASE ORAL 2 TIMES DAILY
Qty: 180 TABLET | Refills: 1 | Status: SHIPPED | OUTPATIENT
Start: 2023-12-14 | End: 2024-12-13

## 2023-12-14 RX ORDER — TIMOLOL MALEATE 5 MG/ML
1 SOLUTION/ DROPS OPHTHALMIC 2 TIMES DAILY
COMMUNITY
Start: 2023-11-16

## 2023-12-14 NOTE — PROGRESS NOTES
Health Maintenance Due   Topic Date Due    COVID-19 Vaccine (1) Never done    TETANUS VACCINE  Never done    Shingles Vaccine (1 of 2) Never done    Influenza Vaccine (1) Never done     Discussed care gaps with pt   Pt is not interested in any vaccines

## 2023-12-18 ENCOUNTER — PATIENT OUTREACH (OUTPATIENT)
Dept: ADMINISTRATIVE | Facility: HOSPITAL | Age: 53
End: 2023-12-18

## 2023-12-18 NOTE — PROGRESS NOTES
Population Health Chart Review & Patient Outreach Details  Per [facility] website, insurance is active and pt is listed on the attributed list needing a healthy you performed in   HY scheduled for oct 2024    Further Action Needed If Patient Returns Outreach:            Updates Requested / Reviewed:     []  Care Everywhere    []     []  External Sources ([facility], [facility], [facility], etc.)    [] [facility]   [] [facility]   [] Other:    []  Care Team Updated   []  Removed  or Duplicate Orders   []  Immunization Reconciliation Completed / Queried    [] [place]   [] [place]   [] [place]   [] [place]      Health Maintenance Topics Addressed and Outreach Outcomes / Actions Taken:             Breast Cancer Screening []  Mammogram Order Placed    []  Mammogram Screening Scheduled    []  External Records Requested & Care Team Updated if Applicable    []  External Records Uploaded & Care Team Updated if Applicable    []  Pt Declined Scheduling Mammogram    []  Pt Will Schedule with External Provider / Order Routed & Care Team Updated if Applicable              Cervical Cancer Screening []  Pap Smear Scheduled in Primary Care or OBGYN    []  External Records Requested & Care Team Updated if Applicable       []  External Records Uploaded, Care Team Updated, & History Updated if Applicable    []  Patient Declined Scheduling Pap Smear    []  Patient Will Schedule with External Provider & Care Team Updated if Applicable                  Colorectal Cancer Screening []  Colonoscopy Case Request / Referral / Home Test Order Placed    []  External Records Requested & Care Team Updated if Applicable    []  External Records Uploaded, Care Team Updated, & History Updated if Applicable    []  Patient Declined Completing Colon Cancer Screening    []  Patient Will Schedule with External Provider & Care Team Updated if Applicable    []  Fit Kit Mailed (add the SmartPhrase under additional notes)    []  Reminded Patient to Complete  Home Test                Diabetic Eye Exam []  Eye Exam Screening Order Placed    []  Eye Camera Scheduled or Optometry/Ophthalmology Referral Placed    []  External Records Requested & Care Team Updated if Applicable    []  External Records Uploaded, Care Team Updated, & History Updated if Applicable    []  Patient Declined Scheduling Eye Exam    []  Patient Will Schedule with External Provider & Care Team Updated if Applicable             Blood Pressure Control []  Primary Care Follow Up Visit Scheduled     []  Remote Blood Pressure Reading Captured    []  Patient Declined Remote Reading or Scheduling Appt - Escalated to PCP    []  Patient Will Call Back or Send Portal Message with Reading                 HbA1c & Other Labs []  Overdue Lab(s) Ordered    []  Overdue Lab(s) Scheduled    []  External Records Uploaded & Care Team Updated if Applicable    []  Primary Care Follow Up Visit Scheduled     []  Reminded Patient to Complete A1c Home Test    []  Patient Declined Scheduling Labs or Will Call Back to Schedule    []  Patient Will Schedule with External Provider / Order Routed, & Care Team Updated if Applicable           Primary Care Appointment []  Primary Care Appt Scheduled    []  Patient Declined Scheduling or Will Call Back to Schedule    []  Pt Established with External Provider, Updated Care Team, & Informed Pt to Notify Payor if Applicable           Medication Adherence /    Statin Use []  Primary Care Appointment Scheduled    []  Patient Reminded to  Prescription    []  Patient Declined, Provider Notified if Needed    []  Sent Provider Message to Review to Evaluate Pt for Statin, Add Exclusion Dx Codes, Document   Exclusion in Problem List, Change Statin Intensity Level to Moderate or High Intensity if Applicable                Osteoporosis Screening []  Dexa Order Placed    []  Dexa Appointment Scheduled    []  External Records Requested & Care Team Updated    []  External Records Uploaded, Care  Team Updated, & History Updated if Applicable    []  Patient Declined Scheduling Dexa or Will Call Back to Schedule    []  Patient Will Schedule with External Provider / Order Routed & Care Team Updated if Applicable       Additional Notes:

## 2023-12-27 PROBLEM — M79.641 RIGHT HAND PAIN: Status: ACTIVE | Noted: 2023-12-27

## 2023-12-27 PROBLEM — R42 DIZZINESS: Status: ACTIVE | Noted: 2023-12-27

## 2023-12-27 PROBLEM — Z09 HOSPITAL DISCHARGE FOLLOW-UP: Status: ACTIVE | Noted: 2023-12-27

## 2023-12-27 PROBLEM — Z00.01 ENCOUNTER FOR GENERAL ADULT MEDICAL EXAMINATION WITH ABNORMAL FINDINGS: Status: RESOLVED | Noted: 2023-10-16 | Resolved: 2023-12-27

## 2023-12-27 NOTE — ASSESSMENT & PLAN NOTE
"Recently d/c from [facility] with fatigue and "feeling bad"  Patient does not know the dx and and we do not have any record from adm  Reports he continues to have fatigue today  Covid/flu negative today   Reports he has been feeling a little better since being d/c  "

## 2023-12-27 NOTE — ASSESSMENT & PLAN NOTE
Blood pressure elevated upon arrival to the clinic  Will increase toprol and have him monitor at home and bring record  This may account for some of the dizziness at times also

## 2023-12-27 NOTE — PROGRESS NOTES
"   HARJINDER Hewitt   [facility]  [address] [address] [address]  [phone]      PATIENT NAME: Jesse Lira  : 1970  DATE: 23  MRN: [id number]      Billing Provider: HARJINDER Hewitt  Level of Service: IN OFFICE/OUTPT VISIT, EST, LEVL IV, 30-39 MIN  Patient PCP Information       Provider PCP Type    HARJINDER Hewitt General            Reason for Visit / Chief Complaint: Follow-up (Hospital follow up from [place] /Discharged 2023)       Update PCP  Update Chief Complaint         History of Present Illness / Problem Focused Workflow     53 year old male presents for hospital follow up   Recently d/c from [place] [facility] [place] with fatigue and "feeling bad"  Patient does not know the dx and and we do not have any record from adm  Reports he continues to have fatigue today  States his test were negative. He was having some fatigue and body aches  Complaints of having dizziness that has has been a problem in the past         Review of Systems     Review of Systems   Constitutional:  Positive for fatigue. Negative for fever.   HENT:  Negative for congestion.    Respiratory:  Negative for cough and shortness of breath.    Cardiovascular:  Negative for chest pain.   Gastrointestinal:  Negative for abdominal pain, constipation, diarrhea and vomiting.   Endocrine: Negative for polydipsia and polyuria.   Musculoskeletal:  Positive for arthralgias. Negative for gait problem.   Allergic/Immunologic: Negative for environmental allergies.   Neurological:  Positive for dizziness. Negative for weakness and headaches.   Psychiatric/Behavioral:  Negative for dysphoric mood. The patient is not nervous/anxious.        Medical / Social / Family History     Past Medical History:   Diagnosis Date    Anemia     Hyperlipidemia     Hypertension        History reviewed. No pertinent surgical history.    Social History    reports that he has never smoked. He " has never used smokeless tobacco. He reports current alcohol use. He reports that he does not use drugs.    Family History  's family history includes No Known Problems in his father and mother.    Medications and Allergies     Medications  Outpatient Medications Marked as Taking for the 12/14/23 encounter (Office Visit) with Jud Estrada FNP   Medication Sig Dispense Refill    atorvastatin (LIPITOR) 10 MG tablet Take 1 tablet (10 mg total) by mouth once daily. 90 tablet 1    chlorthalidone (HYGROTEN) 25 MG Tab Take 1 tablet (25 mg total) by mouth once daily. 90 tablet 1    ergocalciferol (ERGOCALCIFEROL) 50,000 unit Cap Take 1 capsule (50,000 Units total) by mouth every 7 days. 4 capsule 5    ferrous sulfate 325 (65 FE) MG EC tablet Take 1 tablet (325 mg total) by mouth 2 (two) times daily. 60 tablet 5    latanoprost 0.005 % ophthalmic solution Place 1 drop into the right eye every evening. 2.5 mL 2    methocarbamoL (ROBAXIN) 750 MG Tab Take 750 mg by mouth 4 (four) times daily as needed.      pantoprazole (PROTONIX) 40 MG tablet Take 1 tablet (40 mg total) by mouth 2 (two) times daily. 60 tablet 5    potassium chloride (KLOR-CON) 10 MEQ TbSR Take 1 tablet (10 mEq total) by mouth 2 (two) times daily. 60 tablet 2    timolol maleate 0.5% (TIMOPTIC) 0.5 % Drop Place 1 drop into both eyes 2 (two) times daily.      traMADoL (ULTRAM) 50 mg tablet Take 50 mg by mouth every 6 (six) hours as needed.      [DISCONTINUED] metoprolol succinate (TOPROL-XL) 200 MG 24 hr tablet Take 1 tablet (200 mg total) by mouth once daily. 90 tablet 1       Allergies  Review of patient's allergies indicates:   Allergen Reactions    Tomato Other (See Comments)    Tomato (solanum lycopersicum)        Physical Examination     Vitals:    12/14/23 1105   BP: 124/80   Pulse: 80   Resp: 18   Temp: 98.4 °F (36.9 °C)     Physical Exam  Constitutional:       General: He is not in acute distress.  HENT:      Head: Normocephalic.      Nose: Nose  normal.      Mouth/Throat:      Mouth: Mucous membranes are moist.   Eyes:      Extraocular Movements: Extraocular movements intact.   Cardiovascular:      Rate and Rhythm: Normal rate.   Pulmonary:      Effort: Pulmonary effort is normal. No respiratory distress.      Breath sounds: No wheezing.   Abdominal:      General: Bowel sounds are normal.      Palpations: Abdomen is soft.      Tenderness: There is no abdominal tenderness.   Musculoskeletal:         General: Normal range of motion.      Cervical back: Normal range of motion.   Skin:     General: Skin is warm.   Neurological:      Mental Status: He is alert.   Psychiatric:         Behavior: Behavior normal.           Imaging / Labs     Office Visit on 12/14/2023   Component Date Value Ref Range Status    SARS Coronavirus 2 Antigen 12/14/2023 Negative  Negative Final    Rapid Influenza A Ag 12/14/2023 Negative  Negative Final    Rapid Influenza B Ag 12/14/2023 Negative  Negative Final     Acceptable 12/14/2023 Yes   Final     X-Ray Hand 2 View Right  Narrative: EXAMINATION:  XR HAND 2 VIEW RIGHT    CLINICAL HISTORY:  Pain in right hand    TECHNIQUE:  Right hand, AP lateral and oblique views:    COMPARISON:  None.    FINDINGS:  Mineralization is normal.  Osteoarthritis is present at the 1st carpal-metacarpal joint and at the navicular-trapezium articulation.  Mild modeling deformity is seen at the base of the 5th metacarpal which may represent a normal variant versus an old, healed fracture.  No acute fracture or subluxation is seen.  Impression: There is osteoarthritis but no acute bony abnormalities are seen.    Place of service: [facility]    Electronically signed by: Stacie Lara  Date:    12/14/2023  Time:    12:41      Assessment and Plan (including Health Maintenance)      Problem List  Smart Sets  Document Outside HM   :    Health Maintenance Due   Topic Date Due    COVID-19 Vaccine (1) Never done    TETANUS VACCINE  Never  "done    Shingles Vaccine (1 of 2) Never done    Influenza Vaccine (1) Never done       Problem List Items Addressed This Visit          Cardiac/Vascular    Hypertension    Current Assessment & Plan     Blood pressure elevated upon arrival to the clinic  Will increase toprol and have him monitor at home and bring record  This may account for some of the dizziness at times also          Relevant Medications    metoprolol succinate (TOPROL-XL) 200 MG 24 hr tablet       Orthopedic    Right hand pain    Current Assessment & Plan     X-ray indicates possible old fracture and arthritis  Start OTC tylenol arthritis prn          Relevant Orders    X-Ray Hand 2 View Right (Completed)       Other    Hospital discharge follow-up - Primary    Current Assessment & Plan     Recently d/c from [facility] with fatigue and "feeling bad"  Patient does not know the dx and and we do not have any record from adm  Reports he continues to have fatigue today  Covid/flu negative today   Reports he has been feeling a little better since being d/c         Dizziness    Current Assessment & Plan     Reports dizziness has been a problems for a while  Start antivert po  Change positions slowly         Relevant Medications    meclizine (ANTIVERT) 12.5 mg tablet     Other Visit Diagnoses       Nausea and vomiting, unspecified vomiting type        Relevant Orders    POCT SARS-COV2 (COVID) with Flu Antigen (Completed)            Health Maintenance Topics with due status: Not Due       Topic Last Completion Date    Colorectal Cancer Screening 09/04/2015    Lipid Panel 09/13/2022       Future Appointments   Date Time Provider Department Center   3/14/2024 10:00 AM Natalie, Mayela, FNP [facility]   4/16/2024  9:40 AM Natalie, Mayela, FNP [facility]   10/17/2024  8:20 AM Natalie, Mayela, FNP [facility]          Signature:  HARJINDER Hewitt [facility] " [address] [address]  [phone]    Date of encounter: 12/14/23

## 2024-01-22 ENCOUNTER — OFFICE VISIT (OUTPATIENT)
Dept: FAMILY MEDICINE | Facility: CLINIC | Age: 54
End: 2024-01-22
Payer: COMMERCIAL

## 2024-01-22 VITALS
OXYGEN SATURATION: 97 % | HEIGHT: 73 IN | TEMPERATURE: 97 F | DIASTOLIC BLOOD PRESSURE: 82 MMHG | SYSTOLIC BLOOD PRESSURE: 138 MMHG | HEART RATE: 83 BPM | WEIGHT: 166.63 LBS | BODY MASS INDEX: 22.08 KG/M2

## 2024-01-22 DIAGNOSIS — M79.641 RIGHT HAND PAIN: Primary | ICD-10-CM

## 2024-01-22 PROCEDURE — 1160F RVW MEDS BY RX/DR IN RCRD: CPT | Mod: CPTII,,, | Performed by: NURSE PRACTITIONER

## 2024-01-22 PROCEDURE — 1159F MED LIST DOCD IN RCRD: CPT | Mod: CPTII,,, | Performed by: NURSE PRACTITIONER

## 2024-01-22 PROCEDURE — 3008F BODY MASS INDEX DOCD: CPT | Mod: CPTII,,, | Performed by: NURSE PRACTITIONER

## 2024-01-22 PROCEDURE — 99213 OFFICE O/P EST LOW 20 MIN: CPT | Mod: ,,, | Performed by: NURSE PRACTITIONER

## 2024-01-22 PROCEDURE — 3079F DIAST BP 80-89 MM HG: CPT | Mod: CPTII,,, | Performed by: NURSE PRACTITIONER

## 2024-01-22 PROCEDURE — 3075F SYST BP GE 130 - 139MM HG: CPT | Mod: CPTII,,, | Performed by: NURSE PRACTITIONER

## 2024-01-22 RX ORDER — DICLOFENAC SODIUM 10 MG/G
2 GEL TOPICAL 3 TIMES DAILY
Qty: 50 G | Refills: 2 | Status: SHIPPED | OUTPATIENT
Start: 2024-01-22

## 2024-01-22 RX ORDER — PREDNISONE 50 MG/1
50 TABLET ORAL DAILY
Qty: 5 TABLET | Refills: 0 | Status: SHIPPED | OUTPATIENT
Start: 2024-01-22 | End: 2024-04-18

## 2024-01-22 NOTE — PROGRESS NOTES
HARJINDER Hewitt   [facility]  [address] [address] [address]  [phone]      PATIENT NAME: Jesse Lira  : 1970  DATE: 24  MRN: [id number]      Billing Provider: HARJINDER Hewitt  Level of Service: IN OFFICE/OUTPT VISIT, EST, LEVL III, 20-29 MIN  Patient PCP Information       Provider PCP Type    HARJINDER Hewitt General            Reason for Visit / Chief Complaint: Hand Pain (Patient is having right hand pain. Patient states pain has been going on for about 4 months now. Patient hand is swollen patient states the swelling has not went down patient has to put pressure on hands to close his hands. Patient was told to take arthritis (tylenol) medicine but it was not working. )       Update PCP  Update Chief Complaint         History of Present Illness / Problem Focused Workflow     53 year old male with right hand pain  Denies any injury  Reports symptoms starting about 4 months ago        Review of Systems     Review of Systems   Constitutional:  Negative for fatigue and fever.   HENT:  Negative for congestion.    Respiratory:  Negative for cough and shortness of breath.    Cardiovascular:  Negative for chest pain.   Gastrointestinal:  Negative for abdominal pain, constipation, diarrhea and vomiting.   Endocrine: Negative for polydipsia and polyuria.   Musculoskeletal:  Positive for arthralgias. Negative for gait problem.        Right hand pain   Allergic/Immunologic: Negative for environmental allergies.   Neurological:  Negative for dizziness, weakness and headaches.   Psychiatric/Behavioral:  Negative for dysphoric mood. The patient is not nervous/anxious.        Medical / Social / Family History     Past Medical History:   Diagnosis Date    Anemia     Hyperlipidemia     Hypertension        History reviewed. No pertinent surgical history.    Social History    reports that he has never smoked. He has been exposed to tobacco  smoke. He has never used smokeless tobacco. He reports current alcohol use. He reports that he does not use drugs.    Family History  's family history includes No Known Problems in his father and mother.    Medications and Allergies     Medications  Outpatient Medications Marked as Taking for the 1/22/24 encounter (Office Visit) with Jud Estrada FNP   Medication Sig Dispense Refill    atorvastatin (LIPITOR) 10 MG tablet Take 1 tablet (10 mg total) by mouth once daily. 90 tablet 1    chlorthalidone (HYGROTEN) 25 MG Tab Take 1 tablet (25 mg total) by mouth once daily. 90 tablet 1    ergocalciferol (ERGOCALCIFEROL) 50,000 unit Cap Take 1 capsule (50,000 Units total) by mouth every 7 days. 4 capsule 5    ferrous sulfate 325 (65 FE) MG EC tablet Take 1 tablet (325 mg total) by mouth 2 (two) times daily. 60 tablet 5    latanoprost 0.005 % ophthalmic solution Place 1 drop into the right eye every evening. 2.5 mL 2    meclizine (ANTIVERT) 12.5 mg tablet Take 1 tablet (12.5 mg total) by mouth 3 (three) times daily as needed for Dizziness. 30 tablet 2    methocarbamoL (ROBAXIN) 750 MG Tab Take 750 mg by mouth 4 (four) times daily as needed.      metoprolol succinate (TOPROL-XL) 200 MG 24 hr tablet Take 1 tablet (200 mg total) by mouth 2 (two) times daily. 180 tablet 1    pantoprazole (PROTONIX) 40 MG tablet Take 1 tablet (40 mg total) by mouth 2 (two) times daily. 60 tablet 5    potassium chloride (KLOR-CON) 10 MEQ TbSR Take 1 tablet (10 mEq total) by mouth 2 (two) times daily. 60 tablet 2    timolol maleate 0.5% (TIMOPTIC) 0.5 % Drop Place 1 drop into both eyes 2 (two) times daily.      traMADoL (ULTRAM) 50 mg tablet Take 50 mg by mouth every 6 (six) hours as needed.         Allergies  Review of patient's allergies indicates:   Allergen Reactions    Tomato Other (See Comments)    Tomato (solanum lycopersicum)        Physical Examination     Vitals:    01/22/24 1416   BP: 138/82   Pulse: 83   Temp: 97 °F (36.1 °C)      Physical Exam  Constitutional:       General: He is not in acute distress.  HENT:      Nose: Nose normal.      Mouth/Throat:      Mouth: Mucous membranes are moist.   Eyes:      Extraocular Movements: Extraocular movements intact.   Cardiovascular:      Rate and Rhythm: Normal rate.   Pulmonary:      Effort: Pulmonary effort is normal. No respiratory distress.      Breath sounds: No wheezing.   Abdominal:      General: Bowel sounds are normal.      Palpations: Abdomen is soft.      Tenderness: There is no abdominal tenderness.   Musculoskeletal:         General: Swelling (right hand) and tenderness (right hand) present. No deformity or signs of injury. Normal range of motion.      Cervical back: Normal range of motion.   Skin:     General: Skin is warm.   Neurological:      Mental Status: He is alert.   Psychiatric:         Behavior: Behavior normal.           Imaging / Labs     No visits with results within 1 Day(s) from this visit.   Latest known visit with results is:   Office Visit on 12/14/2023   Component Date Value Ref Range Status    SARS Coronavirus 2 Antigen 12/14/2023 Negative  Negative Final    Rapid Influenza A Ag 12/14/2023 Negative  Negative Final    Rapid Influenza B Ag 12/14/2023 Negative  Negative Final     Acceptable 12/14/2023 Yes   Final     X-Ray Hand 3 View Right  Narrative: EXAMINATION:  XR HAND COMPLETE 3 VIEW RIGHT    CLINICAL HISTORY:  Pain in right hand    TECHNIQUE:  Right hand, AP lateral and oblique views:    COMPARISON:  12/14/2023    FINDINGS:  Mineralization is normal.    Mild osteoarthritis is present at the navicular-trapezium articulation and at the 1st carpal-metacarpal joint.  There is no evidence of a fracture or other significant finding.    The fingers are overlapped on the lateral view, so if there is any concern for a phalanx fracture, additional studies of the phalanges would be needed.  Impression: Mild osteoarthritis is present as described.    Place  of service: [facility]    Electronically signed by: Stacie Lara  Date:    01/22/2024  Time:    16:56      Assessment and Plan (including Health Maintenance)      Problem List  Smart Sets  Document Outside HM   :    Health Maintenance Due   Topic Date Due    COVID-19 Vaccine (1) Never done    TETANUS VACCINE  Never done    Shingles Vaccine (1 of 2) Never done    Influenza Vaccine (1) Never done       Problem List Items Addressed This Visit          Orthopedic    Right hand pain - Primary    Current Assessment & Plan     Reports ongoing right hand pain; palm of hand  X-ray today  Discussed risks/benefits/alternatives for treatment  Does not want to see ortho at this point  Will start prednisone daily x 5 days  Diclofenac gel TID prn  Rest  Consider ortho eval if pain continues          Relevant Medications    predniSONE (DELTASONE) 50 MG Tab    diclofenac sodium (VOLTAREN) 1 % Gel    Other Relevant Orders    X-Ray Hand 3 View Right (Completed)       Health Maintenance Topics with due status: Not Due       Topic Last Completion Date    Colorectal Cancer Screening 09/04/2015    Lipid Panel 09/13/2022       Future Appointments   Date Time Provider Department Center   4/16/2024  9:40 AM Natalie, Mayela, FNP [facility]   10/17/2024  8:20 AM Natalie, Mayela, FNP [facility]          Signature:  HARJINDER Hewitt [facility] [address] [place] [address]  [phone]    Date of encounter: 1/22/24

## 2024-01-22 NOTE — ASSESSMENT & PLAN NOTE
Reports ongoing right hand pain; palm of hand  X-ray today  Discussed risks/benefits/alternatives for treatment  Does not want to see ortho at this point  Will start prednisone daily x 5 days  Diclofenac gel TID prn  Rest  Consider ortho eval if pain continues

## 2024-01-22 NOTE — PROGRESS NOTES
Health Maintenance Due   Topic Date Due    COVID-19 Vaccine (1) Never done    TETANUS VACCINE  Never done    Shingles Vaccine (1 of 2) Never done    Influenza Vaccine (1) Never done     Patient declined all care gaps at this time.

## 2024-02-22 NOTE — ASSESSMENT & PLAN NOTE
Reports dizziness has been a problems for a while  Start antivert po  Change positions slowly   no...

## 2024-04-01 PROBLEM — Z09 HOSPITAL DISCHARGE FOLLOW-UP: Status: RESOLVED | Noted: 2023-12-27 | Resolved: 2024-04-01

## 2024-04-18 ENCOUNTER — OFFICE VISIT (OUTPATIENT)
Dept: FAMILY MEDICINE | Facility: CLINIC | Age: 54
End: 2024-04-18
Payer: COMMERCIAL

## 2024-04-18 DIAGNOSIS — K21.9 GASTROESOPHAGEAL REFLUX DISEASE WITHOUT ESOPHAGITIS: ICD-10-CM

## 2024-04-18 DIAGNOSIS — E55.9 VITAMIN D DEFICIENCY: ICD-10-CM

## 2024-04-18 DIAGNOSIS — R03.0 ELEVATED BLOOD PRESSURE READING: ICD-10-CM

## 2024-04-18 DIAGNOSIS — R42 DIZZINESS: ICD-10-CM

## 2024-04-18 DIAGNOSIS — R21 RASH AND NONSPECIFIC SKIN ERUPTION: ICD-10-CM

## 2024-04-18 DIAGNOSIS — I10 HYPERTENSION, UNSPECIFIED TYPE: Primary | ICD-10-CM

## 2024-04-18 DIAGNOSIS — E78.2 MIXED HYPERLIPIDEMIA: ICD-10-CM

## 2024-04-18 DIAGNOSIS — E87.6 POTASSIUM DEFICIENCY: ICD-10-CM

## 2024-04-18 DIAGNOSIS — D50.8 OTHER IRON DEFICIENCY ANEMIA: ICD-10-CM

## 2024-04-18 DIAGNOSIS — Z13.1 SCREENING FOR DIABETES MELLITUS: ICD-10-CM

## 2024-04-18 LAB
25(OH)D3 SERPL-MCNC: 11.9 NG/ML
ALBUMIN SERPL BCP-MCNC: 3.8 G/DL (ref 3.5–5)
ALBUMIN/GLOB SERPL: 0.9 {RATIO}
ALP SERPL-CCNC: 80 U/L (ref 45–115)
ALT SERPL W P-5'-P-CCNC: 28 U/L (ref 16–61)
ANION GAP SERPL CALCULATED.3IONS-SCNC: 16 MMOL/L (ref 7–16)
AST SERPL W P-5'-P-CCNC: 52 U/L (ref 15–37)
BASOPHILS # BLD AUTO: 0.11 K/UL (ref 0–0.2)
BASOPHILS NFR BLD AUTO: 1.4 % (ref 0–1)
BILIRUB SERPL-MCNC: 1.2 MG/DL (ref ?–1.2)
BUN SERPL-MCNC: 9 MG/DL (ref 7–18)
BUN/CREAT SERPL: 8 (ref 6–20)
CALCIUM SERPL-MCNC: 8.6 MG/DL (ref 8.5–10.1)
CHLORIDE SERPL-SCNC: 101 MMOL/L (ref 98–107)
CHOLEST SERPL-MCNC: 198 MG/DL (ref 0–200)
CHOLEST/HDLC SERPL: 1.7 {RATIO}
CO2 SERPL-SCNC: 24 MMOL/L (ref 21–32)
CREAT SERPL-MCNC: 1.16 MG/DL (ref 0.7–1.3)
DIFFERENTIAL METHOD BLD: ABNORMAL
EGFR (NO RACE VARIABLE) (RUSH/TITUS): 75 ML/MIN/1.73M2
EOSINOPHIL # BLD AUTO: 0.1 K/UL (ref 0–0.5)
EOSINOPHIL NFR BLD AUTO: 1.3 % (ref 1–4)
ERYTHROCYTE [DISTWIDTH] IN BLOOD BY AUTOMATED COUNT: 17.3 % (ref 11.5–14.5)
GLOBULIN SER-MCNC: 4.4 G/DL (ref 2–4)
GLUCOSE SERPL-MCNC: 61 MG/DL (ref 74–106)
HCT VFR BLD AUTO: 30.9 % (ref 40–54)
HDLC SERPL-MCNC: 115 MG/DL (ref 40–60)
HGB BLD-MCNC: 9.6 G/DL (ref 13.5–18)
IMM GRANULOCYTES # BLD AUTO: 0.01 K/UL (ref 0–0.04)
IMM GRANULOCYTES NFR BLD: 0.1 % (ref 0–0.4)
IRON SATN MFR SERPL: 50 % (ref 14–50)
IRON SERPL-MCNC: 197 ΜG/DL (ref 65–175)
LDLC SERPL CALC-MCNC: 72 MG/DL
LYMPHOCYTES # BLD AUTO: 1.64 K/UL (ref 1–4.8)
LYMPHOCYTES NFR BLD AUTO: 21 % (ref 27–41)
MCH RBC QN AUTO: 28.7 PG (ref 27–31)
MCHC RBC AUTO-ENTMCNC: 31.1 G/DL (ref 32–36)
MCV RBC AUTO: 92.2 FL (ref 80–96)
MONOCYTES # BLD AUTO: 0.74 K/UL (ref 0–0.8)
MONOCYTES NFR BLD AUTO: 9.5 % (ref 2–6)
MPC BLD CALC-MCNC: 11 FL (ref 9.4–12.4)
NEUTROPHILS # BLD AUTO: 5.22 K/UL (ref 1.8–7.7)
NEUTROPHILS NFR BLD AUTO: 66.7 % (ref 53–65)
NONHDLC SERPL-MCNC: 83 MG/DL
NRBC # BLD AUTO: 0 X10E3/UL
NRBC, AUTO (.00): 0 %
PLATELET # BLD AUTO: 273 K/UL (ref 150–400)
POTASSIUM SERPL-SCNC: 3 MMOL/L (ref 3.5–5.1)
PROT SERPL-MCNC: 8.2 G/DL (ref 6.4–8.2)
RBC # BLD AUTO: 3.35 M/UL (ref 4.6–6.2)
SODIUM SERPL-SCNC: 138 MMOL/L (ref 136–145)
TIBC SERPL-MCNC: 393 ΜG/DL (ref 250–450)
TRIGL SERPL-MCNC: 56 MG/DL (ref 35–150)
VLDLC SERPL-MCNC: 11 MG/DL
WBC # BLD AUTO: 7.82 K/UL (ref 4.5–11)

## 2024-04-18 PROCEDURE — 80061 LIPID PANEL: CPT | Mod: ,,, | Performed by: CLINICAL MEDICAL LABORATORY

## 2024-04-18 PROCEDURE — 83540 ASSAY OF IRON: CPT | Mod: ,,, | Performed by: CLINICAL MEDICAL LABORATORY

## 2024-04-18 PROCEDURE — 80053 COMPREHEN METABOLIC PANEL: CPT | Mod: ,,, | Performed by: CLINICAL MEDICAL LABORATORY

## 2024-04-18 PROCEDURE — 3079F DIAST BP 80-89 MM HG: CPT | Mod: CPTII,,, | Performed by: NURSE PRACTITIONER

## 2024-04-18 PROCEDURE — 3074F SYST BP LT 130 MM HG: CPT | Mod: CPTII,,, | Performed by: NURSE PRACTITIONER

## 2024-04-18 PROCEDURE — 85025 COMPLETE CBC W/AUTO DIFF WBC: CPT | Mod: ,,, | Performed by: CLINICAL MEDICAL LABORATORY

## 2024-04-18 PROCEDURE — 82306 VITAMIN D 25 HYDROXY: CPT | Mod: ,,, | Performed by: CLINICAL MEDICAL LABORATORY

## 2024-04-18 PROCEDURE — 83550 IRON BINDING TEST: CPT | Mod: ,,, | Performed by: CLINICAL MEDICAL LABORATORY

## 2024-04-18 PROCEDURE — 3008F BODY MASS INDEX DOCD: CPT | Mod: CPTII,,, | Performed by: NURSE PRACTITIONER

## 2024-04-18 PROCEDURE — 1159F MED LIST DOCD IN RCRD: CPT | Mod: CPTII,,, | Performed by: NURSE PRACTITIONER

## 2024-04-18 PROCEDURE — 1160F RVW MEDS BY RX/DR IN RCRD: CPT | Mod: CPTII,,, | Performed by: NURSE PRACTITIONER

## 2024-04-18 PROCEDURE — 99214 OFFICE O/P EST MOD 30 MIN: CPT | Mod: ,,, | Performed by: NURSE PRACTITIONER

## 2024-04-18 RX ORDER — POTASSIUM CHLORIDE 750 MG/1
10 TABLET, EXTENDED RELEASE ORAL 2 TIMES DAILY
Qty: 60 TABLET | Refills: 2 | Status: SHIPPED | OUTPATIENT
Start: 2024-04-18 | End: 2024-04-22

## 2024-04-18 RX ORDER — CHLORTHALIDONE 25 MG/1
25 TABLET ORAL DAILY
Qty: 90 TABLET | Refills: 1 | Status: SHIPPED | OUTPATIENT
Start: 2024-04-18

## 2024-04-18 RX ORDER — METOPROLOL SUCCINATE 200 MG/1
200 TABLET, EXTENDED RELEASE ORAL 2 TIMES DAILY
Qty: 180 TABLET | Refills: 1 | Status: SHIPPED | OUTPATIENT
Start: 2024-04-18 | End: 2025-04-18

## 2024-04-18 RX ORDER — ATORVASTATIN CALCIUM 10 MG/1
10 TABLET, FILM COATED ORAL DAILY
Qty: 90 TABLET | Refills: 1 | Status: SHIPPED | OUTPATIENT
Start: 2024-04-18 | End: 2025-04-18

## 2024-04-18 RX ORDER — ERGOCALCIFEROL 1.25 MG/1
50000 CAPSULE ORAL
Qty: 4 CAPSULE | Refills: 5 | Status: SHIPPED | OUTPATIENT
Start: 2024-04-18

## 2024-04-18 RX ORDER — FERROUS SULFATE 325(65) MG
325 TABLET, DELAYED RELEASE (ENTERIC COATED) ORAL 2 TIMES DAILY
Qty: 60 TABLET | Refills: 5 | Status: SHIPPED | OUTPATIENT
Start: 2024-04-18

## 2024-04-18 RX ORDER — CLONIDINE HYDROCHLORIDE 0.1 MG/1
0.1 TABLET ORAL
Status: COMPLETED | OUTPATIENT
Start: 2024-04-18 | End: 2024-04-18

## 2024-04-18 RX ORDER — NYSTATIN AND TRIAMCINOLONE ACETONIDE 100000; 1 [USP'U]/G; MG/G
CREAM TOPICAL 2 TIMES DAILY
Qty: 60 G | Refills: 1 | Status: SHIPPED | OUTPATIENT
Start: 2024-04-18

## 2024-04-18 RX ORDER — PANTOPRAZOLE SODIUM 40 MG/1
40 TABLET, DELAYED RELEASE ORAL 2 TIMES DAILY
Qty: 60 TABLET | Refills: 5 | Status: SHIPPED | OUTPATIENT
Start: 2024-04-18

## 2024-04-18 RX ORDER — MECLIZINE HCL 12.5 MG 12.5 MG/1
12.5 TABLET ORAL 3 TIMES DAILY PRN
Qty: 30 TABLET | Refills: 2 | Status: SHIPPED | OUTPATIENT
Start: 2024-04-18

## 2024-04-18 RX ORDER — CLONIDINE HYDROCHLORIDE 0.1 MG/1
0.2 TABLET ORAL
Status: COMPLETED | OUTPATIENT
Start: 2024-04-18 | End: 2024-04-18

## 2024-04-18 RX ADMIN — CLONIDINE HYDROCHLORIDE 0.1 MG: 0.1 TABLET ORAL at 03:04

## 2024-04-18 RX ADMIN — CLONIDINE HYDROCHLORIDE 0.2 MG: 0.1 TABLET ORAL at 02:04

## 2024-04-18 NOTE — PROGRESS NOTES
Health Maintenance Due   Topic Date Due    TETANUS VACCINE  Never done    Shingles Vaccine (1 of 2) Never done    COVID-19 Vaccine (1 - 2023-24 season) Never done     Discussed care gaps.  Not interested in vaccs.

## 2024-04-22 ENCOUNTER — TELEPHONE (OUTPATIENT)
Dept: FAMILY MEDICINE | Facility: CLINIC | Age: 54
End: 2024-04-22
Payer: COMMERCIAL

## 2024-04-22 DIAGNOSIS — E87.6 POTASSIUM DEFICIENCY: ICD-10-CM

## 2024-04-22 RX ORDER — POTASSIUM CHLORIDE 20 MEQ/1
20 TABLET, EXTENDED RELEASE ORAL 2 TIMES DAILY
Qty: 60 TABLET | Refills: 1 | Status: SHIPPED | OUTPATIENT
Start: 2024-04-22

## 2024-04-22 NOTE — PROGRESS NOTES
Discussed lab results and recommendations with pt via phone  No noted concerns  Voiced understanding  Pt states he is taking all his medications as prescribed

## 2024-04-22 NOTE — TELEPHONE ENCOUNTER
----- Message from HARJINDER Hewitt sent at 4/22/2024 11:28 AM CDT -----  Potassium is low; increased potassium to 20 meq BID. Hemoglobin and hematocrit are low, as well as vitamin D. Has he been taking the meds as prescribed? He needs to make sure he takes the meds daily as prescribed. We need to recheck potassium in 2 weeks and then rest of labs in 3 mo

## 2024-04-24 VITALS
RESPIRATION RATE: 18 BRPM | TEMPERATURE: 98 F | HEIGHT: 73 IN | HEART RATE: 84 BPM | WEIGHT: 168 LBS | BODY MASS INDEX: 22.26 KG/M2 | SYSTOLIC BLOOD PRESSURE: 126 MMHG | DIASTOLIC BLOOD PRESSURE: 88 MMHG | OXYGEN SATURATION: 98 %

## 2024-04-24 PROBLEM — M79.641 RIGHT HAND PAIN: Status: RESOLVED | Noted: 2023-12-27 | Resolved: 2024-04-24

## 2024-04-24 PROBLEM — R03.0 ELEVATED BLOOD PRESSURE READING: Status: RESOLVED | Noted: 2023-03-20 | Resolved: 2024-04-24

## 2024-04-24 NOTE — ASSESSMENT & PLAN NOTE
Reports having dizziness that comes and goes  Denies any HA, chest pain, shortness of breath   Will get labs today   Start antivert po  Change positions slowly  Start back on Toprol for hypertension

## 2024-04-24 NOTE — ASSESSMENT & PLAN NOTE
Blood pressure 194/124 upon arrival to clinic   States he has been out of meds  Reading down to 126/88 following a total of 0.3 mg of clonidine  Restart meds   Monitor blood pressure at home  Educated on low sodium diet

## 2024-04-24 NOTE — PROGRESS NOTES
HARJINDER Hewitt   [facility]  [address] [address]  [phone]      PATIENT NAME: Jesse Lira  : 1970  DATE: 24  MRN: [id number]      Billing Provider: HARJINDER Hewitt  Level of Service: SD OFFICE/OUTPT VISIT, EST, LEVL IV, 30-39 MIN  Patient PCP Information       Provider PCP Type    HARJINDER Hewitt General            Reason for Visit / Chief Complaint: Follow-up (3 month fu.) and Hypertension (Has not been taking his bp meds as prescribed./States 'i have 2 left and have been saving them'.)       Update PCP  Update Chief Complaint         History of Present Illness / Problem Focused Workflow     54 year old male presents for follow up   Blood pressure is elevated today; states he has been out of meds over last few days   Also complaints of having dizziness at times      Review of Systems     Review of Systems   Constitutional:  Positive for fatigue. Negative for fever.   HENT:  Negative for congestion.    Respiratory:  Negative for cough and shortness of breath.    Cardiovascular:  Negative for chest pain and palpitations.   Gastrointestinal:  Negative for abdominal pain, constipation, diarrhea and vomiting.   Endocrine: Negative for polydipsia and polyuria.   Musculoskeletal:  Positive for arthralgias. Negative for gait problem.   Skin:  Positive for rash.   Allergic/Immunologic: Negative for environmental allergies.   Neurological:  Positive for dizziness. Negative for weakness and headaches.   Psychiatric/Behavioral:  Negative for dysphoric mood. The patient is not nervous/anxious.        Medical / Social / Family History     Past Medical History:   Diagnosis Date    Anemia     Hyperlipidemia     Hypertension        No past surgical history on file.    Social History    reports that he has never smoked. He has been exposed to tobacco smoke. He has never used smokeless tobacco. He reports current alcohol use. He  reports that he does not use drugs.    Family History  's family history includes No Known Problems in his father and mother.    Medications and Allergies     Medications  Current Outpatient Medications   Medication Sig Dispense Refill    diclofenac sodium (VOLTAREN) 1 % Gel Apply 2 g topically 3 (three) times daily. 50 g 2    latanoprost 0.005 % ophthalmic solution Place 1 drop into the right eye every evening. 2.5 mL 2    methocarbamoL (ROBAXIN) 750 MG Tab Take 750 mg by mouth 4 (four) times daily as needed.      timolol maleate 0.5% (TIMOPTIC) 0.5 % Drop Place 1 drop into both eyes 2 (two) times daily.      traMADoL (ULTRAM) 50 mg tablet Take 50 mg by mouth every 6 (six) hours as needed.      atorvastatin (LIPITOR) 10 MG tablet Take 1 tablet (10 mg total) by mouth once daily. 90 tablet 1    chlorthalidone (HYGROTEN) 25 MG Tab Take 1 tablet (25 mg total) by mouth once daily. 90 tablet 1    ergocalciferol (ERGOCALCIFEROL) 50,000 unit Cap Take 1 capsule (50,000 Units total) by mouth every 7 days. 4 capsule 5    ferrous sulfate 325 (65 FE) MG EC tablet Take 1 tablet (325 mg total) by mouth 2 (two) times daily. 60 tablet 5    meclizine (ANTIVERT) 12.5 mg tablet Take 1 tablet (12.5 mg total) by mouth 3 (three) times daily as needed for Dizziness. 30 tablet 2    metoprolol succinate (TOPROL-XL) 200 MG 24 hr tablet Take 1 tablet (200 mg total) by mouth 2 (two) times daily. 180 tablet 1    nystatin-triamcinolone (MYCOLOG II) cream Apply topically 2 (two) times daily. 60 g 1    pantoprazole (PROTONIX) 40 MG tablet Take 1 tablet (40 mg total) by mouth 2 (two) times daily. 60 tablet 5    potassium chloride SA (K-DUR,KLOR-CON) 20 MEQ tablet Take 1 tablet (20 mEq total) by mouth 2 (two) times daily. 60 tablet 1     No current facility-administered medications for this visit.       Allergies  Review of patient's allergies indicates:   Allergen Reactions    Tomato Other (See Comments)    Tomato (solanum lycopersicum)         Physical Examination     Vitals:    04/18/24 1620   BP: 126/88   Pulse:    Resp:    Temp:      Physical Exam  Constitutional:       General: He is not in acute distress.  HENT:      Right Ear: Tympanic membrane normal.      Left Ear: Tympanic membrane normal.      Nose: Nose normal.      Mouth/Throat:      Mouth: Mucous membranes are moist.   Eyes:      Extraocular Movements: Extraocular movements intact.   Cardiovascular:      Rate and Rhythm: Normal rate.   Pulmonary:      Effort: Pulmonary effort is normal. No respiratory distress.   Abdominal:      General: Bowel sounds are normal.      Palpations: Abdomen is soft.      Tenderness: There is no abdominal tenderness.   Musculoskeletal:         General: Normal range of motion.      Cervical back: Normal range of motion.   Skin:     General: Skin is warm.   Neurological:      Mental Status: He is alert.   Psychiatric:         Behavior: Behavior normal.           Imaging / Labs     Office Visit on 04/18/2024   Component Date Value Ref Range Status    Vitamin D 25-Hydroxy, Blood 04/18/2024 11.9  ng/mL Final    Iron 04/18/2024 197 (H)  65 - 175 µg/dL Final    Iron Saturation 04/18/2024 50  14 - 50 % Final    TIBC 04/18/2024 393  250 - 450 µg/dL Final    Sodium 04/18/2024 138  136 - 145 mmol/L Final    Potassium 04/18/2024 3.0 (L)  3.5 - 5.1 mmol/L Final    Chloride 04/18/2024 101  98 - 107 mmol/L Final    CO2 04/18/2024 24  21 - 32 mmol/L Final    Anion Gap 04/18/2024 16  7 - 16 mmol/L Final    Glucose 04/18/2024 61 (L)  74 - 106 mg/dL Final    BUN 04/18/2024 9  7 - 18 mg/dL Final    Creatinine 04/18/2024 1.16  0.70 - 1.30 mg/dL Final    BUN/Creatinine Ratio 04/18/2024 8  6 - 20 Final    Calcium 04/18/2024 8.6  8.5 - 10.1 mg/dL Final    Total Protein 04/18/2024 8.2  6.4 - 8.2 g/dL Final    Albumin 04/18/2024 3.8  3.5 - 5.0 g/dL Final    Globulin 04/18/2024 4.4 (H)  2.0 - 4.0 g/dL Final    A/G Ratio 04/18/2024 0.9   Final    Bilirubin, Total 04/18/2024 1.2  >0.0 -  1.2 mg/dL Final    Alk Phos 04/18/2024 80  45 - 115 U/L Final    ALT 04/18/2024 28  16 - 61 U/L Final    AST 04/18/2024 52 (H)  15 - 37 U/L Final    eGFR 04/18/2024 75  >=60 mL/min/1.73m2 Final    Triglycerides 04/18/2024 56  35 - 150 mg/dL Final    Cholesterol 04/18/2024 198  0 - 200 mg/dL Final    HDL Cholesterol 04/18/2024 115 (H)  40 - 60 mg/dL Final    Cholesterol/HDL Ratio (Risk Factor) 04/18/2024 1.7   Final    Non-HDL 04/18/2024 83  mg/dL Final    LDL Calculated 04/18/2024 72  mg/dL Final    VLDL 04/18/2024 11  mg/dL Final    WBC 04/18/2024 7.82  4.50 - 11.00 K/uL Final    RBC 04/18/2024 3.35 (L)  4.60 - 6.20 M/uL Final    Hemoglobin 04/18/2024 9.6 (L)  13.5 - 18.0 g/dL Final    Hematocrit 04/18/2024 30.9 (L)  40.0 - 54.0 % Final    MCV 04/18/2024 92.2  80.0 - 96.0 fL Final    MCH 04/18/2024 28.7  27.0 - 31.0 pg Final    MCHC 04/18/2024 31.1 (L)  32.0 - 36.0 g/dL Final    RDW 04/18/2024 17.3 (H)  11.5 - 14.5 % Final    Platelet Count 04/18/2024 273  150 - 400 K/uL Final    MPV 04/18/2024 11.0  9.4 - 12.4 fL Final    Neutrophils % 04/18/2024 66.7 (H)  53.0 - 65.0 % Final    Lymphocytes % 04/18/2024 21.0 (L)  27.0 - 41.0 % Final    Monocytes % 04/18/2024 9.5 (H)  2.0 - 6.0 % Final    Eosinophils % 04/18/2024 1.3  1.0 - 4.0 % Final    Basophils % 04/18/2024 1.4 (H)  0.0 - 1.0 % Final    Immature Granulocytes % 04/18/2024 0.1  0.0 - 0.4 % Final    nRBC, Auto 04/18/2024 0.0  <=0.0 % Final    Neutrophils, Abs 04/18/2024 5.22  1.80 - 7.70 K/uL Final    Lymphocytes, Absolute 04/18/2024 1.64  1.00 - 4.80 K/uL Final    Monocytes, Absolute 04/18/2024 0.74  0.00 - 0.80 K/uL Final    Eosinophils, Absolute 04/18/2024 0.10  0.00 - 0.50 K/uL Final    Basophils, Absolute 04/18/2024 0.11  0.00 - 0.20 K/uL Final    Immature Granulocytes, Absolute 04/18/2024 0.01  0.00 - 0.04 K/uL Final    nRBC, Absolute 04/18/2024 0.00  <=0.00 x10e3/uL Final    Diff Type 04/18/2024 Auto   Final     X-Ray Hand 3 View Right  Narrative:  EXAMINATION:  XR HAND COMPLETE 3 VIEW RIGHT    CLINICAL HISTORY:  Pain in right hand    TECHNIQUE:  Right hand, AP lateral and oblique views:    COMPARISON:  12/14/2023    FINDINGS:  Mineralization is normal.    Mild osteoarthritis is present at the navicular-trapezium articulation and at the 1st carpal-metacarpal joint.  There is no evidence of a fracture or other significant finding.    The fingers are overlapped on the lateral view, so if there is any concern for a phalanx fracture, additional studies of the phalanges would be needed.  Impression: Mild osteoarthritis is present as described.    Place of service: [facility]    Electronically signed by: Stacie Lara  Date:    01/22/2024  Time:    16:56      Assessment and Plan (including Health Maintenance)      Problem List  Smart Sets  Document Outside HM   :    Health Maintenance Due   Topic Date Due    TETANUS VACCINE  Never done    Shingles Vaccine (1 of 2) Never done    COVID-19 Vaccine (1 - 2023-24 season) Never done       Problem List Items Addressed This Visit          Cardiac/Vascular    Hypertension - Primary    Current Assessment & Plan     Blood pressure 194/124 upon arrival to clinic   States he has been out of meds  Reading down to 126/88 following a total of 0.3 mg of clonidine  Restart meds   Monitor blood pressure at home  Educated on low sodium diet          Relevant Medications    metoprolol succinate (TOPROL-XL) 200 MG 24 hr tablet    chlorthalidone (HYGROTEN) 25 MG Tab    Other Relevant Orders    CBC Auto Differential (Completed)    Comprehensive Metabolic Panel (Completed)    Mixed hyperlipidemia    Current Assessment & Plan     Lab Results   Component Value Date    CHOL 198 04/18/2024    CHOL 256 (H) 09/13/2022    CHOL 195 04/13/2022     Lab Results   Component Value Date     (H) 04/18/2024     (H) 09/13/2022     (H) 04/13/2022     Lab Results   Component Value Date    LDLCALC 72 04/18/2024    LDLCALC 104  09/13/2022    LDLCALC 72 04/13/2022     Lab Results   Component Value Date    TRIG 56 04/18/2024    TRIG 64 09/13/2022    TRIG 60 04/13/2022       Lab Results   Component Value Date    CHOLHDL 1.7 04/18/2024    CHOLHDL 1.8 09/13/2022    CHOLHDL 1.8 04/13/2022      Collect lipids  Continue current plan          Relevant Medications    atorvastatin (LIPITOR) 10 MG tablet    Other Relevant Orders    Lipid Panel (Completed)    RESOLVED: Elevated blood pressure reading       Renal/    Potassium deficiency       GI    Gastroesophageal reflux disease without esophagitis    Relevant Medications    pantoprazole (PROTONIX) 40 MG tablet       Other    Dizziness    Current Assessment & Plan     Reports having dizziness that comes and goes  Denies any HA, chest pain, shortness of breath   Will get labs today   Start antivert po  Change positions slowly  Start back on Toprol for hypertension          Relevant Medications    meclizine (ANTIVERT) 12.5 mg tablet     Other Visit Diagnoses       Other iron deficiency anemia        Relevant Medications    ferrous sulfate 325 (65 FE) MG EC tablet    Other Relevant Orders    Iron and TIBC (Completed)    Vitamin D deficiency        Relevant Medications    ergocalciferol (ERGOCALCIFEROL) 50,000 unit Cap    Other Relevant Orders    Vitamin D (Completed)    Rash and nonspecific skin eruption        Relevant Medications    nystatin-triamcinolone (MYCOLOG II) cream    Screening for diabetes mellitus                Health Maintenance Topics with due status: Not Due       Topic Last Completion Date    Colorectal Cancer Screening 09/04/2015    Lipid Panel 04/18/2024       Future Appointments   Date Time Provider Department Center   5/21/2024  3:40 PM Natalie, Mayela, FNP [facility]   10/17/2024  8:20 AM Natalie, Mayela, FNP [facility]          Signature:  HARJINDER Hewitt [facility] [address] [place] [address]  [phone]    Date of encounter: 4/18/24

## 2024-04-24 NOTE — ASSESSMENT & PLAN NOTE
Lab Results   Component Value Date    CHOL 198 04/18/2024    CHOL 256 (H) 09/13/2022    CHOL 195 04/13/2022     Lab Results   Component Value Date     (H) 04/18/2024     (H) 09/13/2022     (H) 04/13/2022     Lab Results   Component Value Date    LDLCALC 72 04/18/2024    LDLCALC 104 09/13/2022    LDLCALC 72 04/13/2022     Lab Results   Component Value Date    TRIG 56 04/18/2024    TRIG 64 09/13/2022    TRIG 60 04/13/2022       Lab Results   Component Value Date    CHOLHDL 1.7 04/18/2024    CHOLHDL 1.8 09/13/2022    CHOLHDL 1.8 04/13/2022      Collect lipids  Continue current plan

## 2024-06-03 ENCOUNTER — OFFICE VISIT (OUTPATIENT)
Dept: FAMILY MEDICINE | Facility: CLINIC | Age: 54
End: 2024-06-03
Payer: COMMERCIAL

## 2024-06-03 VITALS
SYSTOLIC BLOOD PRESSURE: 116 MMHG | TEMPERATURE: 98 F | RESPIRATION RATE: 18 BRPM | BODY MASS INDEX: 20.01 KG/M2 | HEART RATE: 95 BPM | DIASTOLIC BLOOD PRESSURE: 70 MMHG | HEIGHT: 73 IN | WEIGHT: 151 LBS | OXYGEN SATURATION: 99 %

## 2024-06-03 DIAGNOSIS — E87.6 POTASSIUM DEFICIENCY: ICD-10-CM

## 2024-06-03 DIAGNOSIS — Z12.5 SCREENING FOR MALIGNANT NEOPLASM OF PROSTATE: ICD-10-CM

## 2024-06-03 DIAGNOSIS — Z79.899 ENCOUNTER FOR LONG-TERM (CURRENT) USE OF OTHER MEDICATIONS: ICD-10-CM

## 2024-06-03 DIAGNOSIS — D50.8 OTHER IRON DEFICIENCY ANEMIA: ICD-10-CM

## 2024-06-03 DIAGNOSIS — R74.8 ELEVATED LIVER ENZYMES: ICD-10-CM

## 2024-06-03 DIAGNOSIS — I10 HYPERTENSION, UNSPECIFIED TYPE: ICD-10-CM

## 2024-06-03 DIAGNOSIS — E53.8 LOW SERUM VITAMIN B12: ICD-10-CM

## 2024-06-03 DIAGNOSIS — R53.83 FATIGUE, UNSPECIFIED TYPE: ICD-10-CM

## 2024-06-03 DIAGNOSIS — E55.9 VITAMIN D DEFICIENCY: Primary | ICD-10-CM

## 2024-06-03 PROBLEM — D50.9 IRON DEFICIENCY ANEMIA: Status: ACTIVE | Noted: 2024-06-03

## 2024-06-03 LAB
25(OH)D3 SERPL-MCNC: 28.1 NG/ML
ALBUMIN SERPL BCP-MCNC: 4.3 G/DL (ref 3.5–5)
ALBUMIN/GLOB SERPL: 1 {RATIO}
ALP SERPL-CCNC: 75 U/L (ref 45–115)
ALT SERPL W P-5'-P-CCNC: 46 U/L (ref 16–61)
ANION GAP SERPL CALCULATED.3IONS-SCNC: 15 MMOL/L (ref 7–16)
AST SERPL W P-5'-P-CCNC: 157 U/L (ref 15–37)
BASOPHILS # BLD AUTO: 0.06 K/UL (ref 0–0.2)
BASOPHILS NFR BLD AUTO: 1.6 % (ref 0–1)
BILIRUB SERPL-MCNC: 0.4 MG/DL (ref ?–1.2)
BUN SERPL-MCNC: 23 MG/DL (ref 7–18)
BUN/CREAT SERPL: 15 (ref 6–20)
CALCIUM SERPL-MCNC: 9.2 MG/DL (ref 8.5–10.1)
CHLORIDE SERPL-SCNC: 104 MMOL/L (ref 98–107)
CO2 SERPL-SCNC: 24 MMOL/L (ref 21–32)
CREAT SERPL-MCNC: 1.56 MG/DL (ref 0.7–1.3)
DIFFERENTIAL METHOD BLD: ABNORMAL
EGFR (NO RACE VARIABLE) (RUSH/TITUS): 52 ML/MIN/1.73M2
EOSINOPHIL # BLD AUTO: 0.19 K/UL (ref 0–0.5)
EOSINOPHIL NFR BLD AUTO: 5.2 % (ref 1–4)
ERYTHROCYTE [DISTWIDTH] IN BLOOD BY AUTOMATED COUNT: 17.6 % (ref 11.5–14.5)
EST. AVERAGE GLUCOSE BLD GHB EST-MCNC: 88 MG/DL
FOLATE SERPL-MCNC: 4.5 NG/ML (ref 3.1–17.5)
GLOBULIN SER-MCNC: 4.4 G/DL (ref 2–4)
GLUCOSE SERPL-MCNC: 87 MG/DL (ref 74–106)
HBA1C MFR BLD HPLC: 4.7 % (ref 4.5–6.6)
HCT VFR BLD AUTO: 35.9 % (ref 40–54)
HGB BLD-MCNC: 11.3 G/DL (ref 13.5–18)
IMM GRANULOCYTES # BLD AUTO: 0.01 K/UL (ref 0–0.04)
IMM GRANULOCYTES NFR BLD: 0.3 % (ref 0–0.4)
IRON SATN MFR SERPL: 11 % (ref 14–50)
IRON SERPL-MCNC: 45 ΜG/DL (ref 65–175)
LYMPHOCYTES # BLD AUTO: 1.35 K/UL (ref 1–4.8)
LYMPHOCYTES NFR BLD AUTO: 37 % (ref 27–41)
MCH RBC QN AUTO: 29 PG (ref 27–31)
MCHC RBC AUTO-ENTMCNC: 31.5 G/DL (ref 32–36)
MCV RBC AUTO: 92.1 FL (ref 80–96)
MONOCYTES # BLD AUTO: 0.46 K/UL (ref 0–0.8)
MONOCYTES NFR BLD AUTO: 12.6 % (ref 2–6)
MPC BLD CALC-MCNC: 11.5 FL (ref 9.4–12.4)
NEUTROPHILS # BLD AUTO: 1.58 K/UL (ref 1.8–7.7)
NEUTROPHILS NFR BLD AUTO: 43.3 % (ref 53–65)
NRBC # BLD AUTO: 0 X10E3/UL
NRBC, AUTO (.00): 0 %
PLATELET # BLD AUTO: 148 K/UL (ref 150–400)
POTASSIUM SERPL-SCNC: 3.9 MMOL/L (ref 3.5–5.1)
PROT SERPL-MCNC: 8.7 G/DL (ref 6.4–8.2)
RBC # BLD AUTO: 3.9 M/UL (ref 4.6–6.2)
SODIUM SERPL-SCNC: 139 MMOL/L (ref 136–145)
TESTOST SERPL-MCNC: 183 NG/DL (ref 240–950)
TIBC SERPL-MCNC: 402 ΜG/DL (ref 250–450)
TSH SERPL DL<=0.005 MIU/L-ACNC: 0.57 UIU/ML (ref 0.36–3.74)
VIT B12 SERPL-MCNC: 376 PG/ML (ref 193–986)
WBC # BLD AUTO: 3.65 K/UL (ref 4.5–11)

## 2024-06-03 PROCEDURE — 84403 ASSAY OF TOTAL TESTOSTERONE: CPT | Mod: ,,, | Performed by: CLINICAL MEDICAL LABORATORY

## 2024-06-03 PROCEDURE — 3078F DIAST BP <80 MM HG: CPT | Mod: CPTII,,, | Performed by: NURSE PRACTITIONER

## 2024-06-03 PROCEDURE — 1160F RVW MEDS BY RX/DR IN RCRD: CPT | Mod: CPTII,,, | Performed by: NURSE PRACTITIONER

## 2024-06-03 PROCEDURE — 83550 IRON BINDING TEST: CPT | Mod: ,,, | Performed by: CLINICAL MEDICAL LABORATORY

## 2024-06-03 PROCEDURE — 82607 VITAMIN B-12: CPT | Mod: ,,, | Performed by: CLINICAL MEDICAL LABORATORY

## 2024-06-03 PROCEDURE — 3044F HG A1C LEVEL LT 7.0%: CPT | Mod: CPTII,,, | Performed by: NURSE PRACTITIONER

## 2024-06-03 PROCEDURE — 82306 VITAMIN D 25 HYDROXY: CPT | Mod: ,,, | Performed by: CLINICAL MEDICAL LABORATORY

## 2024-06-03 PROCEDURE — 3008F BODY MASS INDEX DOCD: CPT | Mod: CPTII,,, | Performed by: NURSE PRACTITIONER

## 2024-06-03 PROCEDURE — 83540 ASSAY OF IRON: CPT | Mod: ,,, | Performed by: CLINICAL MEDICAL LABORATORY

## 2024-06-03 PROCEDURE — 99214 OFFICE O/P EST MOD 30 MIN: CPT | Mod: ,,, | Performed by: NURSE PRACTITIONER

## 2024-06-03 PROCEDURE — 80050 GENERAL HEALTH PANEL: CPT | Mod: ,,, | Performed by: CLINICAL MEDICAL LABORATORY

## 2024-06-03 PROCEDURE — 3074F SYST BP LT 130 MM HG: CPT | Mod: CPTII,,, | Performed by: NURSE PRACTITIONER

## 2024-06-03 PROCEDURE — 82746 ASSAY OF FOLIC ACID SERUM: CPT | Mod: ,,, | Performed by: CLINICAL MEDICAL LABORATORY

## 2024-06-03 PROCEDURE — 1159F MED LIST DOCD IN RCRD: CPT | Mod: CPTII,,, | Performed by: NURSE PRACTITIONER

## 2024-06-03 PROCEDURE — 83036 HEMOGLOBIN GLYCOSYLATED A1C: CPT | Mod: ,,, | Performed by: CLINICAL MEDICAL LABORATORY

## 2024-06-03 NOTE — ASSESSMENT & PLAN NOTE
Lab Results   Component Value Date    K 3.0 (L) 04/18/2024      Will recheck potassium today with labs in the clinic and treat as indicated

## 2024-06-03 NOTE — ASSESSMENT & PLAN NOTE
Condition is stable  Reports having increased fatigue   Reports chest pain about a week ago but resolved; denies any SOB or radiating pain   Will get labs today  Refer to cardiology for eval

## 2024-06-03 NOTE — PROGRESS NOTES
HARJINDER Hewitt   [facility]  [address] [address]  [phone]      PATIENT NAME: Jesse Lira  : 1970  DATE: 6/3/24  MRN: [id number]      Billing Provider: HARJINDER Hewitt  Level of Service: PA OFFICE/OUTPT VISIT, EST, LEVL IV, 30-39 MIN  Patient PCP Information       Provider PCP Type    HARJINDER Hewitt General            Reason for Visit / Chief Complaint: Fatigue and Nasal Congestion (Runny nose./Symptoms X1 wk.)       Update PCP  Update Chief Complaint         History of Present Illness / Problem Focused Workflow     54 year old male presents with complaints of increased fatigue x 1 week  Also having runny nose and congestion  Labs were altered last visit with low potassium and vitamin D         Review of Systems     Review of Systems   Constitutional:  Positive for fatigue. Negative for fever.   HENT:  Positive for congestion and rhinorrhea.    Respiratory:  Negative for cough and shortness of breath.    Cardiovascular:  Positive for chest pain (about a week ago; resolved). Negative for palpitations.   Gastrointestinal:  Negative for abdominal pain, constipation, diarrhea, nausea and vomiting.   Endocrine: Positive for polydipsia. Negative for polyuria.   Musculoskeletal:  Positive for arthralgias. Negative for gait problem.   Allergic/Immunologic: Negative for environmental allergies.   Neurological:  Positive for dizziness. Negative for weakness, numbness and headaches.   Psychiatric/Behavioral:  Negative for dysphoric mood. The patient is not nervous/anxious.        Medical / Social / Family History     Past Medical History:   Diagnosis Date    Anemia     Hyperlipidemia     Hypertension        History reviewed. No pertinent surgical history.    Social History    reports that he has never smoked. He has been exposed to tobacco smoke. He has never used smokeless tobacco. He reports current alcohol use. He reports  that he does not use drugs.    Family History  's family history includes No Known Problems in his father and mother.    Medications and Allergies     Medications  Outpatient Medications Marked as Taking for the 6/3/24 encounter (Office Visit) with Jud Estrada FNP   Medication Sig Dispense Refill    atorvastatin (LIPITOR) 10 MG tablet Take 1 tablet (10 mg total) by mouth once daily. 90 tablet 1    chlorthalidone (HYGROTEN) 25 MG Tab Take 1 tablet (25 mg total) by mouth once daily. 90 tablet 1    diclofenac sodium (VOLTAREN) 1 % Gel Apply 2 g topically 3 (three) times daily. 50 g 2    ergocalciferol (ERGOCALCIFEROL) 50,000 unit Cap Take 1 capsule (50,000 Units total) by mouth every 7 days. 4 capsule 5    ferrous sulfate 325 (65 FE) MG EC tablet Take 1 tablet (325 mg total) by mouth 2 (two) times daily. 60 tablet 5    latanoprost 0.005 % ophthalmic solution Place 1 drop into the right eye every evening. 2.5 mL 2    meclizine (ANTIVERT) 12.5 mg tablet Take 1 tablet (12.5 mg total) by mouth 3 (three) times daily as needed for Dizziness. 30 tablet 2    methocarbamoL (ROBAXIN) 750 MG Tab Take 750 mg by mouth 4 (four) times daily as needed.      metoprolol succinate (TOPROL-XL) 200 MG 24 hr tablet Take 1 tablet (200 mg total) by mouth 2 (two) times daily. 180 tablet 1    nystatin-triamcinolone (MYCOLOG II) cream Apply topically 2 (two) times daily. 60 g 1    pantoprazole (PROTONIX) 40 MG tablet Take 1 tablet (40 mg total) by mouth 2 (two) times daily. 60 tablet 5    potassium chloride SA (K-DUR,KLOR-CON) 20 MEQ tablet Take 1 tablet (20 mEq total) by mouth 2 (two) times daily. 60 tablet 1    timolol maleate 0.5% (TIMOPTIC) 0.5 % Drop Place 1 drop into both eyes 2 (two) times daily.      traMADoL (ULTRAM) 50 mg tablet Take 50 mg by mouth every 6 (six) hours as needed.         Allergies  Review of patient's allergies indicates:   Allergen Reactions    Tomato Other (See Comments)    Tomato (solanum lycopersicum)         Physical Examination     Vitals:    06/03/24 1502   BP: 116/70   Pulse: 95   Resp: 18   Temp: 98.3 °F (36.8 °C)     Physical Exam  Constitutional:       General: He is not in acute distress.  HENT:      Nose: Congestion present. No rhinorrhea.      Mouth/Throat:      Mouth: Mucous membranes are moist.   Eyes:      Extraocular Movements: Extraocular movements intact.   Cardiovascular:      Rate and Rhythm: Normal rate.   Pulmonary:      Effort: Pulmonary effort is normal. No respiratory distress.      Breath sounds: No wheezing.   Abdominal:      General: Bowel sounds are normal.      Palpations: Abdomen is soft.   Musculoskeletal:         General: Normal range of motion.      Cervical back: Normal range of motion.   Skin:     General: Skin is warm.   Neurological:      Mental Status: He is alert.   Psychiatric:         Behavior: Behavior normal.           Imaging / Labs     Office Visit on 06/03/2024   Component Date Value Ref Range Status    TSH 06/03/2024 0.575  0.358 - 3.740 uIU/mL Final    Vitamin D 25-Hydroxy, Blood 06/03/2024 28.1  ng/mL Final    Hemoglobin A1C 06/03/2024 4.7  4.5 - 6.6 % Final    Estimated Average Glucose 06/03/2024 88  mg/dL Final    Sodium 06/03/2024 139  136 - 145 mmol/L Final    Potassium 06/03/2024 3.9  3.5 - 5.1 mmol/L Final    Chloride 06/03/2024 104  98 - 107 mmol/L Final    CO2 06/03/2024 24  21 - 32 mmol/L Final    Anion Gap 06/03/2024 15  7 - 16 mmol/L Final    Glucose 06/03/2024 87  74 - 106 mg/dL Final    BUN 06/03/2024 23 (H)  7 - 18 mg/dL Final    Creatinine 06/03/2024 1.56 (H)  0.70 - 1.30 mg/dL Final    BUN/Creatinine Ratio 06/03/2024 15  6 - 20 Final    Calcium 06/03/2024 9.2  8.5 - 10.1 mg/dL Final    Total Protein 06/03/2024 8.7 (H)  6.4 - 8.2 g/dL Final    Albumin 06/03/2024 4.3  3.5 - 5.0 g/dL Final    Globulin 06/03/2024 4.4 (H)  2.0 - 4.0 g/dL Final    A/G Ratio 06/03/2024 1.0   Final    Bilirubin, Total 06/03/2024 0.4  >0.0 - 1.2 mg/dL Final    Alk Phos  06/03/2024 75  45 - 115 U/L Final    ALT 06/03/2024 46  16 - 61 U/L Final    AST 06/03/2024 157 (H)  15 - 37 U/L Final    eGFR 06/03/2024 52 (L)  >=60 mL/min/1.73m2 Final    Iron 06/03/2024 45 (L)  65 - 175 µg/dL Final    Iron Saturation 06/03/2024 11 (L)  14 - 50 % Final    TIBC 06/03/2024 402  250 - 450 µg/dL Final    Vitamin B12 06/03/2024 376  193 - 986 pg/mL Final    Folate 06/03/2024 4.5  3.1 - 17.5 ng/mL Final    Testosterone 06/03/2024 183.0 (L)  240.0 - 950.0 ng/dL Final    WBC 06/03/2024 3.65 (L)  4.50 - 11.00 K/uL Final    RBC 06/03/2024 3.90 (L)  4.60 - 6.20 M/uL Final    Hemoglobin 06/03/2024 11.3 (L)  13.5 - 18.0 g/dL Final    Hematocrit 06/03/2024 35.9 (L)  40.0 - 54.0 % Final    MCV 06/03/2024 92.1  80.0 - 96.0 fL Final    MCH 06/03/2024 29.0  27.0 - 31.0 pg Final    MCHC 06/03/2024 31.5 (L)  32.0 - 36.0 g/dL Final    RDW 06/03/2024 17.6 (H)  11.5 - 14.5 % Final    Platelet Count 06/03/2024 148 (L)  150 - 400 K/uL Final    MPV 06/03/2024 11.5  9.4 - 12.4 fL Final    Neutrophils % 06/03/2024 43.3 (L)  53.0 - 65.0 % Final    Lymphocytes % 06/03/2024 37.0  27.0 - 41.0 % Final    Monocytes % 06/03/2024 12.6 (H)  2.0 - 6.0 % Final    Eosinophils % 06/03/2024 5.2 (H)  1.0 - 4.0 % Final    Basophils % 06/03/2024 1.6 (H)  0.0 - 1.0 % Final    Immature Granulocytes % 06/03/2024 0.3  0.0 - 0.4 % Final    nRBC, Auto 06/03/2024 0.0  <=0.0 % Final    Neutrophils, Abs 06/03/2024 1.58 (L)  1.80 - 7.70 K/uL Final    Lymphocytes, Absolute 06/03/2024 1.35  1.00 - 4.80 K/uL Final    Monocytes, Absolute 06/03/2024 0.46  0.00 - 0.80 K/uL Final    Eosinophils, Absolute 06/03/2024 0.19  0.00 - 0.50 K/uL Final    Basophils, Absolute 06/03/2024 0.06  0.00 - 0.20 K/uL Final    Immature Granulocytes, Absolute 06/03/2024 0.01  0.00 - 0.04 K/uL Final    nRBC, Absolute 06/03/2024 0.00  <=0.00 x10e3/uL Final    Diff Type 06/03/2024 Auto   Final    PSA Total 06/04/2024 0.873  <=4.000 ng/mL Final     X-Ray Hand 3 View  Right  Narrative: EXAMINATION:  XR HAND COMPLETE 3 VIEW RIGHT    CLINICAL HISTORY:  Pain in right hand    TECHNIQUE:  Right hand, AP lateral and oblique views:    COMPARISON:  12/14/2023    FINDINGS:  Mineralization is normal.    Mild osteoarthritis is present at the navicular-trapezium articulation and at the 1st carpal-metacarpal joint.  There is no evidence of a fracture or other significant finding.    The fingers are overlapped on the lateral view, so if there is any concern for a phalanx fracture, additional studies of the phalanges would be needed.  Impression: Mild osteoarthritis is present as described.    Place of service: [facility]    Electronically signed by: Stacie Lara  Date:    01/22/2024  Time:    16:56      Assessment and Plan (including Health Maintenance)      Problem List  Smart Sets  Document Outside HM   :    Health Maintenance Due   Topic Date Due    TETANUS VACCINE  Never done    Shingles Vaccine (1 of 2) Never done    COVID-19 Vaccine (1 - 2023-24 season) Never done       Problem List Items Addressed This Visit          Cardiac/Vascular    Hypertension    Current Assessment & Plan     Condition is stable  Reports having increased fatigue   Reports chest pain about a week ago but resolved; denies any SOB or radiating pain   Will get labs today  Refer to cardiology for eval          Relevant Orders    CBC Auto Differential (Completed)    Comprehensive Metabolic Panel (Completed)    Ambulatory referral/consult to Cardiology       Renal/    Potassium deficiency    Current Assessment & Plan     Lab Results   Component Value Date    K 3.0 (L) 04/18/2024      Will recheck potassium today with labs in the clinic and treat as indicated             Oncology    Iron deficiency anemia    Relevant Orders    Iron and TIBC (Completed)    Vitamin B12 & Folate (Completed)       Endocrine    Vitamin D deficiency - Primary    Current Assessment & Plan     Last vit D was 11  Reports having  increased fatigue since last visit  Will recheck today with other labs and treat as indicated          Relevant Orders    Vitamin D (Completed)     Other Visit Diagnoses       Encounter for long-term (current) use of other medications        Relevant Orders    TSH (Completed)    Hemoglobin A1C (Completed)    Fatigue, unspecified type        Relevant Orders    Testosterone (Completed)    Ambulatory referral/consult to Cardiology    Testosterone, Total and Free, S    Screening for malignant neoplasm of prostate        Relevant Orders    PSA, Screening (Completed)    Low serum vitamin B12        Relevant Medications    cyanocobalamin (VITAMIN B-12) 1000 MCG tablet    Elevated liver enzymes        Relevant Orders    US Abdomen Limited_Liver            Health Maintenance Topics with due status: Not Due       Topic Last Completion Date    Colorectal Cancer Screening 09/04/2015    Lipid Panel 04/18/2024       Future Appointments   Date Time Provider Department Center   10/17/2024  8:20 AM [facility]          Signature:  HARJINDER Hewitt [facility] [address] [place] [address]  [phone]    Date of encounter: 6/3/24

## 2024-06-03 NOTE — ASSESSMENT & PLAN NOTE
Last vit D was 11  Reports having increased fatigue since last visit  Will recheck today with other labs and treat as indicated

## 2024-06-04 ENCOUNTER — TELEPHONE (OUTPATIENT)
Dept: FAMILY MEDICINE | Facility: CLINIC | Age: 54
End: 2024-06-04
Payer: COMMERCIAL

## 2024-06-04 LAB — PSA SERPL-MCNC: 0.87 NG/ML

## 2024-06-04 PROCEDURE — G0103 PSA SCREENING: HCPCS | Mod: ,,, | Performed by: CLINICAL MEDICAL LABORATORY

## 2024-06-04 RX ORDER — LANOLIN ALCOHOL/MO/W.PET/CERES
1000 CREAM (GRAM) TOPICAL DAILY
Qty: 90 TABLET | Refills: 1 | Status: SHIPPED | OUTPATIENT
Start: 2024-06-04

## 2024-06-04 NOTE — TELEPHONE ENCOUNTER
----- Message from HARJINDER Hewitt sent at 6/4/2024 11:29 AM CDT -----  Renal function is decreased; we can add a nephrology consult or he can increase water intake and recheck it in 3 mo. Also liver enzymes continue to be elevated; added an order for liver US. Testosterone is low; I need him to come in one morning before 10 am and have it redrawn to make sure it is correct. B12 is also low; will send in some po b12 to start. Vit D is improving. Need to recheck labs in 3mo

## 2024-06-04 NOTE — PROGRESS NOTES
Called pt   Spoke with sister his alt contact  Left message to have pt return call to clinic   Telephone encounter created

## 2024-06-10 ENCOUNTER — TELEPHONE (OUTPATIENT)
Dept: CARDIOLOGY | Facility: CLINIC | Age: 54
End: 2024-06-10
Payer: COMMERCIAL

## 2024-06-11 ENCOUNTER — TELEPHONE (OUTPATIENT)
Dept: CARDIOLOGY | Facility: CLINIC | Age: 54
End: 2024-06-11
Payer: COMMERCIAL

## 2024-06-17 ENCOUNTER — TELEPHONE (OUTPATIENT)
Dept: CARDIOLOGY | Facility: CLINIC | Age: 54
End: 2024-06-17
Payer: COMMERCIAL

## 2024-06-24 ENCOUNTER — TELEPHONE (OUTPATIENT)
Dept: FAMILY MEDICINE | Facility: CLINIC | Age: 54
End: 2024-06-24

## 2024-06-24 DIAGNOSIS — R79.89 ELEVATED SERUM CREATININE: Primary | ICD-10-CM

## 2024-06-24 NOTE — TELEPHONE ENCOUNTER
discussed recent lab results from 6/3/24 w/ pt.  agreed to nephro referral.  voiced understanding.

## 2024-06-25 DIAGNOSIS — I10 HYPERTENSION, UNSPECIFIED TYPE: Primary | ICD-10-CM

## 2024-07-15 DIAGNOSIS — R53.83 FATIGUE, UNSPECIFIED TYPE: Primary | ICD-10-CM

## 2024-07-22 ENCOUNTER — TELEPHONE (OUTPATIENT)
Dept: FAMILY MEDICINE | Facility: CLINIC | Age: 54
End: 2024-07-22

## 2024-07-22 NOTE — TELEPHONE ENCOUNTER
----- Message from HARJINDER Hewitt sent at 7/22/2024 11:02 AM CDT -----  Testosterone is on lower side of normal with second reading. He would probably benefit from low dose testosterone for a few months. I can send it in but he will have to get someone to help with injections. Let us know where he wants it sent

## 2024-08-05 DIAGNOSIS — E34.9 HYPOTESTOSTERONEMIA: Primary | ICD-10-CM

## 2024-08-05 RX ORDER — TESTOSTERONE 20.25 MG/1.25G
20.25 GEL TOPICAL EVERY MORNING
Qty: 75 G | Refills: 1 | Status: SHIPPED | OUTPATIENT
Start: 2024-08-05

## 2025-01-20 ENCOUNTER — TELEPHONE (OUTPATIENT)
Dept: NEUROLOGY | Facility: CLINIC | Age: 55
End: 2025-01-20
Payer: COMMERCIAL

## 2025-01-20 NOTE — TELEPHONE ENCOUNTER
Called to resc remigio for 1/21/2025 cancelled due to weather. No answer and no voicemail. Mailed an appt letter with new appt date/time.

## 2025-01-29 ENCOUNTER — OFFICE VISIT (OUTPATIENT)
Dept: NEPHROLOGY | Facility: CLINIC | Age: 55
End: 2025-01-29
Payer: COMMERCIAL

## 2025-01-29 VITALS
BODY MASS INDEX: 23.59 KG/M2 | SYSTOLIC BLOOD PRESSURE: 124 MMHG | HEART RATE: 62 BPM | DIASTOLIC BLOOD PRESSURE: 70 MMHG | HEIGHT: 73 IN | RESPIRATION RATE: 18 BRPM | WEIGHT: 178 LBS | OXYGEN SATURATION: 97 %

## 2025-01-29 DIAGNOSIS — I10 ESSENTIAL HYPERTENSION: Primary | ICD-10-CM

## 2025-01-29 DIAGNOSIS — R79.89 ELEVATED SERUM CREATININE: ICD-10-CM

## 2025-01-29 PROCEDURE — 3066F NEPHROPATHY DOC TX: CPT | Mod: CPTII,,, | Performed by: INTERNAL MEDICINE

## 2025-01-29 PROCEDURE — 1159F MED LIST DOCD IN RCRD: CPT | Mod: CPTII,,, | Performed by: INTERNAL MEDICINE

## 2025-01-29 PROCEDURE — 3078F DIAST BP <80 MM HG: CPT | Mod: CPTII,,, | Performed by: INTERNAL MEDICINE

## 2025-01-29 PROCEDURE — 3074F SYST BP LT 130 MM HG: CPT | Mod: CPTII,,, | Performed by: INTERNAL MEDICINE

## 2025-01-29 PROCEDURE — 99215 OFFICE O/P EST HI 40 MIN: CPT | Mod: PBBFAC | Performed by: INTERNAL MEDICINE

## 2025-01-29 PROCEDURE — 99204 OFFICE O/P NEW MOD 45 MIN: CPT | Mod: S$PBB,,, | Performed by: INTERNAL MEDICINE

## 2025-01-29 PROCEDURE — 3008F BODY MASS INDEX DOCD: CPT | Mod: CPTII,,, | Performed by: INTERNAL MEDICINE

## 2025-01-29 PROCEDURE — 99999 PR PBB SHADOW E&M-EST. PATIENT-LVL V: CPT | Mod: PBBFAC,,, | Performed by: INTERNAL MEDICINE

## 2025-01-29 RX ORDER — LOSARTAN POTASSIUM 25 MG/1
25 TABLET ORAL DAILY
Qty: 30 TABLET | Refills: 11 | Status: SHIPPED | OUTPATIENT
Start: 2025-01-29 | End: 2026-01-29

## 2025-01-29 NOTE — PROGRESS NOTES
[facility] History and Physical  Patient Name: Jesse Lira  MRN: [id number]  Age: 54 y.o.  : 1970    Date: 2025 3:10 PM    Chief Complaint: Establish Care    HPI :   Mr Lira presents to Nephrology clinic to establish care. He is followed by HARJINDER Ochoa as his primary care provider.     HTN: diagnosed about 5 years ago. Current regimen includes chlorthalidone 25 mg daily, metoprolol 200 mg daily. Well controlled.     Hyperlipidemia: Atorvastatin    Nephrology history: No FH of kidney disease, no nephrolithiasis, or recurrent UTIs. No OTC medications including NSAIDs.     Patient denies any CP, SOB, peripheral edema, dysuria, hematuria, changes in urinary habits, or increased frequency of urination.  He reports a lot of dizziness.  He says that sometimes it is positional when he goes from sitting to standing.  However it happens intermittently as well.  He was referred to Cardiology however he missed his appointments.    Past Medical History:  has a past medical history of Anemia, Hyperlipidemia, and Hypertension.     Past Surgical History:   has no past surgical history on file.     Family History:  family history includes Heart attack in his sister; No Known Problems in his father and mother. No family history of kidney disease.     Social History:   reports that he has never smoked. He has been exposed to tobacco smoke. He has never used smokeless tobacco. He reports current alcohol use. He reports that he does not use drugs.     Allergies: is allergic to tomato and tomato (solanum lycopersicum).     Medications: Reviewed including OTC medications, herbal supplements, and NSAIDS.     Old records have been reviewed.      Review of Systems:  ROS: A 10 point ROS was completed and found to be negative except for that mentioned above.          Physical Exam:  Vitals:    25 1506   BP: 124/70   Pulse: 62   Resp: 18       Constitutional:  sitting in chair, in NAD  Eyes: EOMI, white sclera  ENMT: moist mucus membranes, nares patent  Cardiovascular: normal rate, S1/S2 noted, no edema  Respiratory: symmetrical chest expansion, CTA-B  Gastrointestinal: +BS, soft, NT/ND  Musculoskeletal: normal, no joint erythema/effusions  Skin: no rash, no purpura, warm extremities  Neurological: Alert and Oriented x 4, afocal    Labs:   Lab Results   Component Value Date     06/03/2024    K 3.9 06/03/2024    CREATININE 1.56 (H) 06/03/2024    ALT 46 06/03/2024     (H) 06/03/2024    HGBA1C 4.7 06/03/2024    LDLCALC 72 04/18/2024    CHOL 198 04/18/2024     (H) 04/18/2024    TRIG 56 04/18/2024    TSH 0.575 06/03/2024    WBC 3.65 (L) 06/03/2024    HGB 11.3 (L) 06/03/2024    HCT 35.9 (L) 06/03/2024     (L) 06/03/2024    PSA 0.873 06/04/2024        Otherwise Reviewed    Assessment/Plan:       WAN.  Renal function fluctuates.  I am curious if he gets dizzy from dehydration on a diuretic.  I will try to stop the thiazide diuretic and start him on losartan.  Another consideration for his dizziness would be symptomatic bradycardia.  He takes a hefty dose of beta-blockers.  I agree with his cardiology referral.  Counseled to avoid nephrotoxic agents such as NSAIDs. Will obtain baseline renal ultrasound.    Proteinuria: urine Prot:Creat ratio is pending.     Anemia: CBC pending.  Patient is on oral iron pills daily.  Reports having a colonoscopy in the past.    BMD: Calcium and Phosphorus PTH, Vit D pending    HTN: well controlled with current meds     RTC 3 months with CBC, RFP, UA, urine for Prot:creat ratio, PTH, Vit D      Alisha S. Parker, DO [facility]   01/29/2025

## 2025-01-30 DIAGNOSIS — D50.9 MICROCYTIC ANEMIA: Primary | ICD-10-CM

## 2025-01-30 RX ORDER — ERGOCALCIFEROL 1.25 MG/1
50000 CAPSULE ORAL
Qty: 12 CAPSULE | Refills: 0 | Status: SHIPPED | OUTPATIENT
Start: 2025-01-30 | End: 2025-04-18

## 2025-01-30 NOTE — TELEPHONE ENCOUNTER
Spoke w/ pts sister, she is aware of his lab work.  has never had a colonoscopy before, order placed for that.

## 2025-01-30 NOTE — TELEPHONE ENCOUNTER
----- Message from Kira Reynoso DO sent at 1/30/2025 10:36 AM CST -----  Renal function similar to last check. He has iron deficiency but he is already on his iron supplement. His blood counts have improved since last summer. I would encourage him to make sure he is up to date on all his colonoscopy. He has no protein in his urine. He is vit d deficient. Please send ergo for Vit D deficiency. TY

## 2025-05-19 ENCOUNTER — TELEPHONE (OUTPATIENT)
Dept: NEPHROLOGY | Facility: CLINIC | Age: 55
End: 2025-05-19
Payer: COMMERCIAL

## 2025-05-19 NOTE — TELEPHONE ENCOUNTER
Copied from CRM #[id number]. Topic: Appointments - Appointment Access  >> May 16, 2025  3:10 PM Alissa wrote:  Jesse Pankaj called in because he missed his 3mo follow up appt with Kira Reynoso on 04/29 and is wanting to reschedule it. First available isnt until 10/22. Please give him a courtesy call back with a sooner appt. Thank you.